# Patient Record
Sex: FEMALE | Race: WHITE | NOT HISPANIC OR LATINO | Employment: OTHER | URBAN - METROPOLITAN AREA
[De-identification: names, ages, dates, MRNs, and addresses within clinical notes are randomized per-mention and may not be internally consistent; named-entity substitution may affect disease eponyms.]

---

## 2018-04-25 DIAGNOSIS — I25.10 CORONARY ARTERY DISEASE, ANGINA PRESENCE UNSPECIFIED, UNSPECIFIED VESSEL OR LESION TYPE, UNSPECIFIED WHETHER NATIVE OR TRANSPLANTED HEART: Primary | ICD-10-CM

## 2018-04-25 DIAGNOSIS — R07.9 CHEST PAIN, UNSPECIFIED TYPE: ICD-10-CM

## 2018-04-30 ENCOUNTER — OFFICE VISIT (OUTPATIENT)
Dept: CARDIOLOGY | Facility: CLINIC | Age: 63
End: 2018-04-30
Payer: COMMERCIAL

## 2018-04-30 VITALS
WEIGHT: 166.69 LBS | DIASTOLIC BLOOD PRESSURE: 65 MMHG | HEIGHT: 67 IN | BODY MASS INDEX: 26.16 KG/M2 | SYSTOLIC BLOOD PRESSURE: 123 MMHG | HEART RATE: 68 BPM | OXYGEN SATURATION: 97 %

## 2018-04-30 DIAGNOSIS — I10 ESSENTIAL HYPERTENSION: ICD-10-CM

## 2018-04-30 DIAGNOSIS — E78.49 OTHER HYPERLIPIDEMIA: ICD-10-CM

## 2018-04-30 DIAGNOSIS — R07.89 ATYPICAL CHEST PAIN: ICD-10-CM

## 2018-04-30 DIAGNOSIS — I25.119 CORONARY ARTERY DISEASE INVOLVING NATIVE CORONARY ARTERY OF NATIVE HEART WITH ANGINA PECTORIS: ICD-10-CM

## 2018-04-30 DIAGNOSIS — R07.9 CHEST PAIN, UNSPECIFIED TYPE: Primary | ICD-10-CM

## 2018-04-30 DIAGNOSIS — K21.9 GASTROESOPHAGEAL REFLUX DISEASE, ESOPHAGITIS PRESENCE NOT SPECIFIED: ICD-10-CM

## 2018-04-30 DIAGNOSIS — I25.10 CORONARY ARTERY DISEASE, ANGINA PRESENCE UNSPECIFIED, UNSPECIFIED VESSEL OR LESION TYPE, UNSPECIFIED WHETHER NATIVE OR TRANSPLANTED HEART: ICD-10-CM

## 2018-04-30 PROCEDURE — 3074F SYST BP LT 130 MM HG: CPT | Mod: CPTII,S$GLB,, | Performed by: INTERNAL MEDICINE

## 2018-04-30 PROCEDURE — 3078F DIAST BP <80 MM HG: CPT | Mod: CPTII,S$GLB,, | Performed by: INTERNAL MEDICINE

## 2018-04-30 PROCEDURE — 99999 PR PBB SHADOW E&M-EST. PATIENT-LVL III: CPT | Mod: PBBFAC,,, | Performed by: INTERNAL MEDICINE

## 2018-04-30 PROCEDURE — 99205 OFFICE O/P NEW HI 60 MIN: CPT | Mod: S$GLB,,, | Performed by: INTERNAL MEDICINE

## 2018-04-30 RX ORDER — VALACYCLOVIR HYDROCHLORIDE 1 G/1
1000 TABLET, FILM COATED ORAL DAILY PRN
Refills: 1 | COMMUNITY
Start: 2018-02-26 | End: 2023-03-23

## 2018-04-30 RX ORDER — SULFAMETHOXAZOLE AND TRIMETHOPRIM 800; 160 MG/1; MG/1
TABLET ORAL
Refills: 0 | COMMUNITY
Start: 2018-04-24 | End: 2018-04-30 | Stop reason: ALTCHOICE

## 2018-04-30 RX ORDER — EZETIMIBE 10 MG/1
TABLET ORAL
Refills: 3 | COMMUNITY
Start: 2018-02-15 | End: 2018-04-30

## 2018-04-30 RX ORDER — BUPROPION HYDROCHLORIDE 150 MG/1
150 TABLET ORAL DAILY PRN
Refills: 0 | COMMUNITY
Start: 2018-04-11 | End: 2022-08-22

## 2018-04-30 RX ORDER — PROMETHAZINE HYDROCHLORIDE AND DEXTROMETHORPHAN HYDROBROMIDE 6.25; 15 MG/5ML; MG/5ML
SYRUP ORAL
Refills: 0 | COMMUNITY
Start: 2018-03-20 | End: 2018-04-30

## 2018-04-30 RX ORDER — MUPIROCIN CALCIUM 20 MG/G
CREAM TOPICAL
Refills: 1 | COMMUNITY
Start: 2018-02-15 | End: 2018-04-30

## 2018-04-30 RX ORDER — CLOPIDOGREL BISULFATE 75 MG/1
TABLET ORAL
Refills: 0 | COMMUNITY
Start: 2018-04-17 | End: 2022-08-22

## 2018-04-30 RX ORDER — TRIAMCINOLONE ACETONIDE 5 MG/G
CREAM TOPICAL
Refills: 0 | COMMUNITY
Start: 2018-03-14 | End: 2018-04-30

## 2018-04-30 RX ORDER — LOSARTAN POTASSIUM 25 MG/1
TABLET ORAL
Refills: 1 | COMMUNITY
Start: 2018-03-06 | End: 2022-08-22

## 2018-04-30 RX ORDER — ROSUVASTATIN CALCIUM 5 MG/1
TABLET, COATED ORAL
Refills: 4 | COMMUNITY
Start: 2018-04-05 | End: 2022-08-22

## 2018-04-30 RX ORDER — DOXYCYCLINE 100 MG/1
CAPSULE ORAL
Refills: 0 | COMMUNITY
Start: 2018-03-20 | End: 2018-04-30

## 2018-04-30 RX ORDER — PANTOPRAZOLE SODIUM 40 MG/1
TABLET, DELAYED RELEASE ORAL
Refills: 6 | COMMUNITY
Start: 2018-04-19 | End: 2022-08-22

## 2018-04-30 RX ORDER — DICLOFENAC SODIUM 10 MG/G
GEL TOPICAL
Refills: 1 | COMMUNITY
Start: 2018-03-14 | End: 2018-04-30

## 2018-04-30 RX ORDER — LOSARTAN POTASSIUM 50 MG/1
TABLET ORAL
Refills: 1 | COMMUNITY
Start: 2018-02-27 | End: 2018-04-30

## 2018-04-30 RX ORDER — AMOXICILLIN 500 MG/1
CAPSULE ORAL
Refills: 0 | COMMUNITY
Start: 2018-02-16 | End: 2018-04-30

## 2018-04-30 NOTE — PROGRESS NOTES
Cardiology Clinic Note  Reason for Visit: Chest Pain  Referring MD: Kervin Edgar    HPI:   61 y/o  female who is referred from Dr. Kervin Edgar for recurrent chest pain. Pt reports she has been having substernal nonradiating chest pressure that mostly occurs at rest in the AM after waking up and always lasting < 1hr. It can also sometimes come about while walking but doesn't seem to be exacerbated by more strenuous physical activity such as tennis. Not associated SOB, palpitations, or diaphoresis. Not related to meals. This has been occuring for the last 5 years. She has several normal exercise and nuclear stress tests in the past. She was seen by Dr. Briggs in [place], [place] and he proceeded straight to [facility] which revealed a moderate mLAD lesion with an IFR=.86 so he placed two 2.5x8mm OTONIEL with a good result on 7/2017. Because of a GI bleeding event related to aspirin the pt was started on Plavix, despite being a rapid metabolizer. She was also started on praulent as she was intolerant of pravastatin 2/2 myalgias. Her chest pain never improved after PCI. She is very anxious and reports anxiety also can bring about the pain. She has tried SL nitro which did not help the pain. She states the pain will usually just go away on its own. She is only on lasartan for BP and SBP runs in 110's and HR in 50-60's. She thought her CP may have actually been worsened by the praluent injections as episodes would occur 2 days after every injection. This was stopped in Dec and pt had a normal exercise treadmill test at that time as well. The frequency of the pain had diminished until about 3 weeks ago. She is now getting 2-3 episodes a week. It is similar in nature and severity to the pain she has always had over the last 5 years.    The pt has a history of HTN, HLD, and CAD with DESx2(2.5x8mm) to mLAD in 7/2017. She also had a GI bleed in Jan 2018 requiring PRBC transfusions and an EGD which showed Gastric ulcers. She was  "taking alot of BC powder for HA's at this time along with ASA and plavix. Her ASA was stopped after this and her plavix was changed to 75mg MWF. She denies any further melena and is no longer taking BC powder.    ROS:    Constitution: Negative for fever or chills. Negative for weight loss or gain.   HENT: Negative for sore throat or headaches. Negative for rhinorrhea.  Eyes: Negative for blurred or double vision.   Cardiovascular: See above  Pulmonary: Negative for SOB. Negative for cough.   Gastrointestinal: Negative for abdominal pain. Negative for nausea/ vomiting. Negative for diarrhea.   : Negative for dysuria.   Neurological: Negative for focal weakness or sensory changes.  PMH:     Past Medical History:   Diagnosis Date    Coronary artery disease     Hyperlipidemia     Hypertension      History reviewed. No pertinent surgical history.  Allergies:   Review of patient's allergies indicates:  NKDA  Medications:   Losartan 12.5mg QD  Crestor 5mg QD  Zetia 5mg QD  Plavix 75mg MWF    Social History:     Social History   Substance Use Topics    Smoking status: Never Smoker    Smokeless tobacco: Never Used    Alcohol use No     Family History:   History reviewed. No pertinent family history.  Physical Exam:   /65 (BP Location: Right arm, Patient Position: Sitting, BP Method: Large (Automatic))   Pulse 68   Ht 5' 7" (1.702 m)   Wt 75.6 kg (166 lb 10.7 oz)   SpO2 97%   BMI 26.10 kg/m²      Constitutional: No acute distress, conversant  HEENT: Sclera anicteric, Pupils equal, round and reactive to light, extraocular motions intact, Oropharynx clear  Neck: No JVD, no carotid bruits  Cardiovascular: regular rate and rhythm, no murmur, rubs or gallops, normal S1/S2  Pulmonary: Clear to auscultation bilaterally  Abdominal: Abdomen soft, nontender, nondistended, positive bowel sounds  Extremities: No lower extremity edema,   Pulses: 2+ BL Radial, 2+ BL carotid, 2+ BL DP, 2+ BL PT, normal Allens Test " BL  Skin: No ecchymosis, erythema, or ulcers  Psych: Alert and oriented x 3, appropriate affect  Neuro: CNII-XII intact, no focal deficits    Labs:     No results found for: NA, K, CL, CO2, BUN, CREATININE, GLUCOSE, ANIONGAP  No results found for: AST, ALT, ALKPHOS, BILITOT, BILIDIR, ALBUMIN  No results found for: CALCIUM, MG, PHOS  No results found for: BNP, BNPTRIAGEBLO No results found for: WBC, HGB, HCT, PLT, GRAN  No results found for: PTT, INR  No results found for: CHOL, HDL, LDLCALC, TRIG  No results found for: HGBA1C  No results found for: TSH, Y0YQMMT       Imaging:     EKG:NSR  Assessment:     Atypical Chest Pain  - It is unclear whether her initial symptoms were every related to coronary ischemia given her atypical symptoms, and otherwise normal coronary arteries other than the focal mLAD lesion that was stented with a good result, lack of any real improvement in her symptoms after stenting, and the fact that all her stress test have been normal for the last 5 years  - Will proceed with PET stress to evaluate for ischemia. If normal will need to evaluate noncardiac etiologies of chest pain.    HTN  - Well controlled  - Cont losartan    CAD with DESx2(2.5x8mm) to mLAD in 7/2017  - Can stop Plavix as she is >6 months out and PCI was not for ACS  - Would Start enteric coated 81mg ASA and cont Protonix given hx of GI bleed    HLD  - Cont Crestor and zetia. She is tolerating crestor well    Signed:  Miguel Ángel Patrick MD  Cardiology Fellow  [phone]  4/30/2018 2:41 PM    PET STRESS RESULTS 05/01/2018  CONCLUSIONS: NORMAL MYOCARDIAL PERFUSION PET STRESS TEST  1. The perfusion scan is free of evidence for myocardial ischemia or injury.   2. Resting wall motion is physiologic. Stress wall motion is physiologic.   3. LV function is normal at rest and stress.  (normal is >= 51%)  4. The ventricular volumes are normal at rest and stress.   5. The extracardiac distribution of radioactivity is normal.   6. There was no  "previous study available to compare.    We discussed these results with the patient and her  and went over her angiogram films with her to reassure her of the status of coronary arteries.  I discussed stress management options and suggested they consult with a therapist for "tools" to help her deal with her life stress issues.    I have personally taken the history and examined his patient and agree with the resident's note as stated above.    Follow-up:   Future Appointments  Date Time Provider Department Center   5/1/2018 12:30 PM CARDIAC, PET IMAGING [facility] C[facility] Smith     "

## 2018-04-30 NOTE — LETTER
May 2, 2018      Kervin Edgar MD  [address]  [place] [address]           [facility]  [address]  [facility] [address]  Phone: [phone]          Patient: Tres Durham   MR Number: [id number]   YOB: 1955   Date of Visit: 4/30/2018       Dear Dr. Kervin Edgar:    Thank you for referring Tres Durham to me for evaluation. Attached you will find relevant portions of my assessment and plan of care.    If you have questions, please do not hesitate to call me. I look forward to following Tres Durham along with you.    Sincerely,    Johnny Tucker MD    Enclosure  CC:  No Recipients    If you would like to receive this communication electronically, please contact [email or web address] or [phone] to request more information on [employer] Link access.    For providers and/or their staff who would like to refer a patient to [facility], please contact us through our one-stop-shop provider referral line, [facility] , at [phone].    If you feel you have received this communication in error or would no longer like to receive these types of communications, please e-mail [email or web address]

## 2018-05-01 ENCOUNTER — CLINICAL SUPPORT (OUTPATIENT)
Dept: CARDIOLOGY | Facility: CLINIC | Age: 63
End: 2018-05-01
Attending: INTERNAL MEDICINE
Payer: COMMERCIAL

## 2018-05-01 DIAGNOSIS — R07.9 CHEST PAIN, UNSPECIFIED TYPE: ICD-10-CM

## 2018-05-01 DIAGNOSIS — I25.10 CORONARY ARTERY DISEASE, ANGINA PRESENCE UNSPECIFIED, UNSPECIFIED VESSEL OR LESION TYPE, UNSPECIFIED WHETHER NATIVE OR TRANSPLANTED HEART: ICD-10-CM

## 2018-05-01 PROCEDURE — 93015 CV STRESS TEST SUPVJ I&R: CPT | Mod: S$GLB,,, | Performed by: INTERNAL MEDICINE

## 2018-05-01 PROCEDURE — 78492 MYOCRD IMG PET MLT RST&STRS: CPT | Mod: S$GLB,,, | Performed by: INTERNAL MEDICINE

## 2018-05-01 PROCEDURE — A9555 RB82 RUBIDIUM: HCPCS | Mod: S$GLB,,, | Performed by: INTERNAL MEDICINE

## 2020-02-18 ENCOUNTER — HISTORICAL (OUTPATIENT)
Dept: ADMINISTRATIVE | Facility: HOSPITAL | Age: 65
End: 2020-02-18

## 2020-02-18 LAB
ALBUMIN SERPL-MCNC: 4 GM/DL (ref 3.4–5)
ALBUMIN/GLOB SERPL: 1.5 RATIO (ref 1.1–2)
ALP SERPL-CCNC: 82 UNIT/L (ref 38–126)
ALT SERPL-CCNC: 33 UNIT/L (ref 12–78)
BILIRUB SERPL-MCNC: 0.4 MG/DL (ref 0.2–1)
BILIRUBIN DIRECT+TOT PNL SERPL-MCNC: 0.1 MG/DL (ref 0–0.5)
BILIRUBIN DIRECT+TOT PNL SERPL-MCNC: 0.3 MG/DL (ref 0–0.8)
BUN SERPL-MCNC: 17 MG/DL (ref 7–18)
CALCIUM SERPL-MCNC: 8.7 MG/DL (ref 8.5–10.1)
CHLORIDE SERPL-SCNC: 107 MMOL/L (ref 98–107)
CHOLEST SERPL-MCNC: 113 MG/DL (ref 0–200)
CK SERPL-CCNC: 172 UNIT/L (ref 26–192)
CO2 SERPL-SCNC: 30 MMOL/L (ref 21–32)
CREAT SERPL-MCNC: 0.75 MG/DL (ref 0.55–1.02)
CREAT/UREA NIT SERPL: 22.7
ERYTHROCYTE [DISTWIDTH] IN BLOOD BY AUTOMATED COUNT: 12.2 % (ref 11.5–17)
ERYTHROCYTE [SEDIMENTATION RATE] IN BLOOD: 4 MM/HR (ref 0–20)
GGT SERPL-CCNC: 17 UNIT/L (ref 5–85)
GLOBULIN SER-MCNC: 2.7 GM/DL (ref 2.4–3.5)
GLUCOSE SERPL-MCNC: 108 MG/DL (ref 74–106)
HCT VFR BLD AUTO: 46.8 % (ref 37–47)
HGB BLD-MCNC: 14.3 GM/DL (ref 12–16)
LDH SERPL-CCNC: 218 UNIT/L (ref 84–246)
MCH RBC QN AUTO: 29.5 PG (ref 27–31)
MCHC RBC AUTO-ENTMCNC: 30.6 GM/DL (ref 33–36)
MCV RBC AUTO: 96.7 FL (ref 80–94)
PHOSPHATE SERPL-MCNC: 2.9 MG/DL (ref 2.5–4.9)
PLATELET # BLD AUTO: 233 X10(3)/MCL (ref 130–400)
PMV BLD AUTO: 9.9 FL (ref 9.4–12.4)
POTASSIUM SERPL-SCNC: 4.5 MMOL/L (ref 3.5–5.1)
PROT SERPL-MCNC: 6.7 GM/DL (ref 6.4–8.2)
RBC # BLD AUTO: 4.84 X10(6)/MCL (ref 4.2–5.4)
SODIUM SERPL-SCNC: 140 MMOL/L (ref 136–145)
TRIGL SERPL-MCNC: 165 MG/DL (ref 30–150)
URATE SERPL-MCNC: 4 MG/DL (ref 2.6–7.2)
URATE SERPL-MCNC: 4 MG/DL (ref 2.6–7.2)
WBC # SPEC AUTO: 6.1 X10(3)/MCL (ref 4.5–11.5)

## 2020-12-20 LAB — NONINV COLON CA DNA+OCC BLD SCRN STL QL: NEGATIVE

## 2021-02-02 ENCOUNTER — HISTORICAL (OUTPATIENT)
Dept: ADMINISTRATIVE | Facility: HOSPITAL | Age: 66
End: 2021-02-02

## 2021-02-02 LAB
ABS NEUT (OLG): 2.48 X10(3)/MCL (ref 2.1–9.2)
ALBUMIN SERPL-MCNC: 4.3 GM/DL (ref 3.4–4.8)
ALBUMIN/GLOB SERPL: 1.7 RATIO (ref 1.1–2)
ALP SERPL-CCNC: 93 UNIT/L (ref 40–150)
ALT SERPL-CCNC: 21 UNIT/L (ref 0–55)
AST SERPL-CCNC: 26 UNIT/L (ref 5–34)
BASOPHILS # BLD AUTO: 0 X10(3)/MCL (ref 0–0.2)
BASOPHILS NFR BLD AUTO: 0 %
BILIRUB SERPL-MCNC: 0.7 MG/DL
BILIRUBIN DIRECT+TOT PNL SERPL-MCNC: 0.3 MG/DL (ref 0–0.5)
BILIRUBIN DIRECT+TOT PNL SERPL-MCNC: 0.4 MG/DL (ref 0–0.8)
BUN SERPL-MCNC: 13.9 MG/DL (ref 9.8–20.1)
CALCIUM SERPL-MCNC: 9.9 MG/DL (ref 8.4–10.2)
CHLORIDE SERPL-SCNC: 105 MMOL/L (ref 98–107)
CO2 SERPL-SCNC: 34 MMOL/L (ref 23–31)
CREAT SERPL-MCNC: 0.8 MG/DL (ref 0.55–1.02)
DEPRECATED CALCIDIOL+CALCIFEROL SERPL-MC: 141.6 NG/ML (ref 30–80)
EOSINOPHIL # BLD AUTO: 0.2 X10(3)/MCL (ref 0–0.9)
EOSINOPHIL NFR BLD AUTO: 4 %
ERYTHROCYTE [DISTWIDTH] IN BLOOD BY AUTOMATED COUNT: 12.5 % (ref 11.5–17)
EST. AVERAGE GLUCOSE BLD GHB EST-MCNC: 137 MG/DL
GLOBULIN SER-MCNC: 2.5 GM/DL (ref 2.4–3.5)
GLUCOSE SERPL-MCNC: 111 MG/DL (ref 82–115)
HBA1C MFR BLD: 6.4 %
HCT VFR BLD AUTO: 48.8 % (ref 37–47)
HGB BLD-MCNC: 15.5 GM/DL (ref 12–16)
LYMPHOCYTES # BLD AUTO: 1.5 X10(3)/MCL (ref 0.6–4.6)
LYMPHOCYTES NFR BLD AUTO: 32 %
MCH RBC QN AUTO: 30.3 PG (ref 27–31)
MCHC RBC AUTO-ENTMCNC: 31.8 GM/DL (ref 33–36)
MCV RBC AUTO: 95.5 FL (ref 80–94)
MONOCYTES # BLD AUTO: 0.4 X10(3)/MCL (ref 0.1–1.3)
MONOCYTES NFR BLD AUTO: 10 %
NEUTROPHILS # BLD AUTO: 2.48 X10(3)/MCL (ref 2.1–9.2)
NEUTROPHILS NFR BLD AUTO: 54 %
PLATELET # BLD AUTO: 242 X10(3)/MCL (ref 130–400)
PMV BLD AUTO: 9.8 FL (ref 9.4–12.4)
POTASSIUM SERPL-SCNC: 5.1 MMOL/L (ref 3.5–5.1)
PROT SERPL-MCNC: 6.8 GM/DL (ref 5.8–7.6)
RBC # BLD AUTO: 5.11 X10(6)/MCL (ref 4.2–5.4)
SODIUM SERPL-SCNC: 142 MMOL/L (ref 136–145)
TSH SERPL-ACNC: 2.53 UIU/ML (ref 0.35–4.94)
WBC # SPEC AUTO: 4.6 X10(3)/MCL (ref 4.5–11.5)

## 2021-03-05 ENCOUNTER — HISTORICAL (OUTPATIENT)
Dept: RADIOLOGY | Facility: HOSPITAL | Age: 66
End: 2021-03-05

## 2021-03-05 LAB — BMD RECOMMENDATION EXT: NORMAL

## 2021-03-18 ENCOUNTER — HISTORICAL (OUTPATIENT)
Dept: ADMINISTRATIVE | Facility: HOSPITAL | Age: 66
End: 2021-03-18

## 2021-03-22 ENCOUNTER — HISTORICAL (OUTPATIENT)
Dept: ADMINISTRATIVE | Facility: HOSPITAL | Age: 66
End: 2021-03-22

## 2021-03-22 LAB
ABS NEUT (OLG): 2.73 X10(3)/MCL (ref 2.1–9.2)
BASOPHILS # BLD AUTO: 0 X10(3)/MCL (ref 0–0.2)
BASOPHILS NFR BLD AUTO: 1 %
EOSINOPHIL # BLD AUTO: 0.2 X10(3)/MCL (ref 0–0.9)
EOSINOPHIL NFR BLD AUTO: 5 %
ERYTHROCYTE [DISTWIDTH] IN BLOOD BY AUTOMATED COUNT: 12.5 % (ref 11.5–17)
HCT VFR BLD AUTO: 48.8 % (ref 37–47)
HGB BLD-MCNC: 15.5 GM/DL (ref 12–16)
LYMPHOCYTES # BLD AUTO: 1.6 X10(3)/MCL (ref 0.6–4.6)
LYMPHOCYTES NFR BLD AUTO: 32 %
MCH RBC QN AUTO: 30.5 PG (ref 27–31)
MCHC RBC AUTO-ENTMCNC: 31.8 GM/DL (ref 33–36)
MCV RBC AUTO: 95.9 FL (ref 80–94)
MONOCYTES # BLD AUTO: 0.4 X10(3)/MCL (ref 0.1–1.3)
MONOCYTES NFR BLD AUTO: 9 %
NEUTROPHILS # BLD AUTO: 2.73 X10(3)/MCL (ref 2.1–9.2)
NEUTROPHILS NFR BLD AUTO: 54 %
PLATELET # BLD AUTO: 243 X10(3)/MCL (ref 130–400)
PMV BLD AUTO: 10 FL (ref 9.4–12.4)
RBC # BLD AUTO: 5.09 X10(6)/MCL (ref 4.2–5.4)
WBC # SPEC AUTO: 5.1 X10(3)/MCL (ref 4.5–11.5)

## 2021-03-25 ENCOUNTER — HISTORICAL (OUTPATIENT)
Dept: ADMINISTRATIVE | Facility: HOSPITAL | Age: 66
End: 2021-03-25

## 2021-03-25 LAB — SARS-COV-2 RNA RESP QL NAA+PROBE: NOT DETECTED

## 2021-11-30 ENCOUNTER — PATIENT MESSAGE (OUTPATIENT)
Dept: RESEARCH | Facility: HOSPITAL | Age: 66
End: 2021-11-30
Payer: COMMERCIAL

## 2021-12-20 ENCOUNTER — HISTORICAL (OUTPATIENT)
Dept: ADMINISTRATIVE | Facility: HOSPITAL | Age: 66
End: 2021-12-20

## 2022-02-03 ENCOUNTER — HISTORICAL (OUTPATIENT)
Dept: ADMINISTRATIVE | Facility: HOSPITAL | Age: 67
End: 2022-02-03

## 2022-02-03 LAB
ABS NEUT (OLG): 3.69 (ref 2.1–9.2)
BASOPHILS # BLD AUTO: 0 10*3/UL (ref 0–0.2)
BASOPHILS NFR BLD AUTO: 0 %
DEPRECATED CALCIDIOL+CALCIFEROL SERPL-MC: 131 NG/ML (ref 30–80)
EOSINOPHIL # BLD AUTO: 0.3 10*3/UL (ref 0–0.9)
EOSINOPHIL NFR BLD AUTO: 5 %
ERYTHROCYTE [DISTWIDTH] IN BLOOD BY AUTOMATED COUNT: 12.7 % (ref 11.5–17)
EST. AVERAGE GLUCOSE BLD GHB EST-MCNC: 154.2 MG/DL
HBA1C MFR BLD: 7 %
HCT VFR BLD AUTO: 40.5 % (ref 37–47)
HGB BLD-MCNC: 12.8 G/DL (ref 12–16)
LYMPHOCYTES # BLD AUTO: 1.2 10*3/UL (ref 0.6–4.6)
LYMPHOCYTES NFR BLD AUTO: 22 %
MANUAL DIFF? (OHS): NO
MCH RBC QN AUTO: 29.4 PG (ref 27–31)
MCHC RBC AUTO-ENTMCNC: 31.6 G/DL (ref 33–36)
MCV RBC AUTO: 93.1 FL (ref 80–94)
MONOCYTES # BLD AUTO: 0.5 10*3/UL (ref 0.1–1.3)
MONOCYTES NFR BLD AUTO: 8 %
NEUTROPHILS # BLD AUTO: 3.69 10*3/UL (ref 2.1–9.2)
NEUTROPHILS NFR BLD AUTO: 65 %
PLATELET # BLD AUTO: 292 10*3/UL (ref 130–400)
PMV BLD AUTO: 9.8 FL (ref 9.4–12.4)
RBC # BLD AUTO: 4.35 10*6/UL (ref 4.2–5.4)
TSH SERPL-ACNC: 1.21 M[IU]/L (ref 0.35–4.94)
WBC # SPEC AUTO: 5.7 10*3/UL (ref 4.5–11.5)

## 2022-04-10 ENCOUNTER — HISTORICAL (OUTPATIENT)
Dept: ADMINISTRATIVE | Facility: HOSPITAL | Age: 67
End: 2022-04-10
Payer: COMMERCIAL

## 2022-04-19 ENCOUNTER — HISTORICAL (OUTPATIENT)
Dept: ADMINISTRATIVE | Facility: HOSPITAL | Age: 67
End: 2022-04-19
Payer: COMMERCIAL

## 2022-04-19 LAB — TROPONIN I SERPL-MCNC: <0.01 NG/ML (ref 0–0.04)

## 2022-04-25 VITALS
BODY MASS INDEX: 26.57 KG/M2 | DIASTOLIC BLOOD PRESSURE: 86 MMHG | WEIGHT: 169.31 LBS | HEIGHT: 67 IN | SYSTOLIC BLOOD PRESSURE: 138 MMHG | OXYGEN SATURATION: 97 %

## 2022-05-19 ENCOUNTER — PATIENT OUTREACH (OUTPATIENT)
Dept: ADMINISTRATIVE | Facility: HOSPITAL | Age: 67
End: 2022-05-19
Payer: COMMERCIAL

## 2022-05-19 NOTE — PROGRESS NOTES
Population Health. Out Reach.  The following record(s)  below were uploaded for Health Maintenance .    3/5/21 DEXA SCREENING

## 2022-05-26 DIAGNOSIS — R73.09 IMPAIRED GLUCOSE METABOLISM: Primary | ICD-10-CM

## 2022-06-02 ENCOUNTER — PATIENT OUTREACH (OUTPATIENT)
Dept: ADMINISTRATIVE | Facility: HOSPITAL | Age: 67
End: 2022-06-02
Payer: COMMERCIAL

## 2022-06-02 NOTE — PROGRESS NOTES
Pre-Visit Call   Since your last visit have you been hospitalized or treated in the emergency room or urgent care? N/A  If yes: When, Where and Why?   Have you seen any other healthcare providers since your last visit with your Primary Care Provider? N/A  If Yes: When and Who?   Tasks (Labs, Radiology, Medical Records)  pending  Any Labs or Diagnostics since last visit? No    Quality Metrics:  Cervical Cancer Screen: No Hyst  Mammogram Screen: Yes  3/5/21  Bone Density Screen: Yes 3/5/21  Colorectal Screen: Yes 12/16/20  Diabetes Eye Exam: No  Diabetes Hgb A1C: No  Diabetes Micro albumi: No    Types of Colorectal Screen  Cologuard    Additional Comments:   pv call 6/2/22 labs: A1C Left message 11:32  Last labs: 2/3/22  Last wellness 2/8/22      Patient Reminders:  1. Bring Medication bottles with you to your appointment.   2. Take Blood pressure medicine at least one hour prior to appointment.

## 2022-07-12 ENCOUNTER — PATIENT OUTREACH (OUTPATIENT)
Dept: ADMINISTRATIVE | Facility: HOSPITAL | Age: 67
End: 2022-07-12
Payer: COMMERCIAL

## 2022-07-12 NOTE — PROGRESS NOTES
Population Health. Out Reach.  The following record(s)  below were uploaded for Health Maintenance .    12/16/20 SALVATORE

## 2022-08-12 ENCOUNTER — TELEPHONE (OUTPATIENT)
Dept: INTERNAL MEDICINE | Facility: CLINIC | Age: 67
End: 2022-08-12
Payer: COMMERCIAL

## 2022-08-16 ENCOUNTER — LAB VISIT (OUTPATIENT)
Dept: LAB | Facility: HOSPITAL | Age: 67
End: 2022-08-16
Attending: INTERNAL MEDICINE
Payer: MEDICARE

## 2022-08-16 DIAGNOSIS — R73.09 IMPAIRED GLUCOSE METABOLISM: ICD-10-CM

## 2022-08-16 LAB
EST. AVERAGE GLUCOSE BLD GHB EST-MCNC: 131.2 MG/DL
HBA1C MFR BLD: 6.2 %

## 2022-08-16 PROCEDURE — 83036 HEMOGLOBIN GLYCOSYLATED A1C: CPT

## 2022-08-16 PROCEDURE — 36415 COLL VENOUS BLD VENIPUNCTURE: CPT

## 2022-08-19 RX ORDER — VORTIOXETINE 10 MG/1
1 TABLET, FILM COATED ORAL DAILY
COMMUNITY
End: 2022-08-22

## 2022-08-22 ENCOUNTER — OFFICE VISIT (OUTPATIENT)
Dept: INTERNAL MEDICINE | Facility: CLINIC | Age: 67
End: 2022-08-22
Payer: MEDICARE

## 2022-08-22 VITALS
TEMPERATURE: 98 F | SYSTOLIC BLOOD PRESSURE: 118 MMHG | WEIGHT: 165.38 LBS | BODY MASS INDEX: 26.58 KG/M2 | HEART RATE: 67 BPM | OXYGEN SATURATION: 98 % | DIASTOLIC BLOOD PRESSURE: 80 MMHG | HEIGHT: 66 IN

## 2022-08-22 DIAGNOSIS — F41.8 MIXED ANXIETY DEPRESSIVE DISORDER: ICD-10-CM

## 2022-08-22 DIAGNOSIS — I10 PRIMARY HYPERTENSION: ICD-10-CM

## 2022-08-22 DIAGNOSIS — E78.1 HYPERTRIGLYCERIDEMIA: Primary | ICD-10-CM

## 2022-08-22 DIAGNOSIS — I10 PRIMARY HYPERTENSION: Primary | ICD-10-CM

## 2022-08-22 DIAGNOSIS — K21.9 GASTROESOPHAGEAL REFLUX DISEASE, UNSPECIFIED WHETHER ESOPHAGITIS PRESENT: ICD-10-CM

## 2022-08-22 DIAGNOSIS — E88.810 METABOLIC SYNDROME: ICD-10-CM

## 2022-08-22 DIAGNOSIS — R73.09 IMPAIRED GLUCOSE METABOLISM: ICD-10-CM

## 2022-08-22 DIAGNOSIS — E78.5 HYPERLIPIDEMIA, UNSPECIFIED HYPERLIPIDEMIA TYPE: ICD-10-CM

## 2022-08-22 PROBLEM — Z98.61 CAD S/P PERCUTANEOUS CORONARY ANGIOPLASTY: Status: ACTIVE | Noted: 2022-08-22

## 2022-08-22 PROBLEM — I25.10 CAD S/P PERCUTANEOUS CORONARY ANGIOPLASTY: Status: ACTIVE | Noted: 2022-08-22

## 2022-08-22 PROCEDURE — 99214 OFFICE O/P EST MOD 30 MIN: CPT | Mod: ,,, | Performed by: INTERNAL MEDICINE

## 2022-08-22 PROCEDURE — 99214 PR OFFICE/OUTPT VISIT, EST, LEVL IV, 30-39 MIN: ICD-10-PCS | Mod: ,,, | Performed by: INTERNAL MEDICINE

## 2022-08-22 RX ORDER — BUSPIRONE HYDROCHLORIDE 10 MG/1
10 TABLET ORAL 3 TIMES DAILY PRN
Qty: 90 TABLET | Refills: 5 | Status: SHIPPED | OUTPATIENT
Start: 2022-08-22 | End: 2023-03-23

## 2022-08-22 RX ORDER — BUSPIRONE HYDROCHLORIDE 10 MG/1
1 TABLET ORAL 3 TIMES DAILY PRN
COMMUNITY
Start: 2022-08-08 | End: 2022-08-22 | Stop reason: SDUPTHER

## 2022-08-22 RX ORDER — CLONAZEPAM 0.5 MG/1
0.5 TABLET ORAL NIGHTLY PRN
Qty: 30 TABLET | Refills: 0 | Status: SHIPPED | OUTPATIENT
Start: 2022-08-22 | End: 2022-08-23 | Stop reason: SDUPTHER

## 2022-08-22 NOTE — PROGRESS NOTES
Subjective:      Patient ID: Tres Durham is a 66 y.o. female.    Chief Complaint: Follow-up (6 month)    Ms. Joshua is a 64-year-old female who is here today for her 6 month follow up. Her comorbidities include HLD, hypertriglyceridemia, metabolic syndrome, HTN and a positive ADRIANNA. She also has patellofemoral syndrome for which she had received a steroid injection in her left knee.  Patient has a hx of a rectocele for which patient is now s/p correction and seems to be doing well.       MCW:   Cologuard: 2020, negative  MM2021  DEXA: 2021  Pap: Hysterectomy  Pneumonia vaccine: educated     Cardiology: Dr. Peralta  Orthopedics: Dr. Moon  GYN: Dr. Kumari  Neurology: Dr. Ruiz    The patient's Health Maintenance was reviewed and the following appears to be due at this time:   Health Maintenance Due   Topic Date Due    Mammogram  2022        Past Medical History:  Past Medical History:   Diagnosis Date    Anxiety and depression     CAD S/P percutaneous coronary angioplasty     Coronary artery disease     Herpes labialis     Hyperlipidemia     Hypertension     Hypertriglyceridemia     Metabolic syndrome     Pneumococcal vaccination declined by patient      Past Surgical History:   Procedure Laterality Date    endometriosis surgery      GI bleed      heart stent      prolapse surgery/rectocele      REPAIR OF RECTOCELE      ROTATOR CUFF REPAIR Right     TONSILLECTOMY      TOTAL ABDOMINAL HYSTERECTOMY      WRIST SURGERY       Review of patient's allergies indicates:  No Known Allergies  Social History     Socioeconomic History    Marital status:    Tobacco Use    Smoking status: Never Smoker    Smokeless tobacco: Never Used   Substance and Sexual Activity    Alcohol use: No    Drug use: No    Sexual activity: Yes     Family History   Problem Relation Age of Onset    Heart failure Mother        Review of Systems   Constitutional: Negative for activity change,  "appetite change and fatigue.   HENT: Negative for postnasal drip, rhinorrhea, sinus pain and sore throat.    Eyes: Negative for visual disturbance.   Respiratory: Negative for cough, shortness of breath and wheezing.    Cardiovascular: Negative for chest pain and palpitations.   Gastrointestinal: Negative for abdominal distention, blood in stool, constipation, diarrhea, nausea and vomiting.   Genitourinary: Negative for dysuria, flank pain, frequency and hematuria.   Musculoskeletal: Negative for arthralgias, back pain and joint swelling.   Skin: Negative for rash and wound.   Neurological: Negative for dizziness, seizures, weakness and headaches.   Psychiatric/Behavioral: Negative for agitation and suicidal ideas.       A comprehensive review of systems was performed and is negative except for that stated above  Objective:   /80 (BP Location: Left arm, Patient Position: Sitting, BP Method: Small (Manual))   Pulse 67   Temp 98 °F (36.7 °C) (Temporal)   Ht 5' 6" (1.676 m)   Wt 75 kg (165 lb 6.4 oz)   SpO2 98%   BMI 26.70 kg/m²     Physical Exam  Constitutional:       Appearance: Normal appearance.   HENT:      Head: Normocephalic and atraumatic.      Nose: Nose normal.      Mouth/Throat:      Mouth: Mucous membranes are moist.      Pharynx: Oropharynx is clear.   Eyes:      Extraocular Movements: Extraocular movements intact.      Pupils: Pupils are equal, round, and reactive to light.   Cardiovascular:      Rate and Rhythm: Normal rate and regular rhythm.      Pulses: Normal pulses.   Pulmonary:      Effort: Pulmonary effort is normal.      Breath sounds: Normal breath sounds.   Abdominal:      General: Bowel sounds are normal.      Palpations: Abdomen is soft.   Musculoskeletal:         General: Normal range of motion.      Cervical back: Normal range of motion and neck supple.   Skin:     General: Skin is warm.   Neurological:      General: No focal deficit present.      Mental Status: She is alert and " oriented to person, place, and time. Mental status is at baseline.   Psychiatric:         Mood and Affect: Mood normal.       Assessment/ Plan:   1. Hypertriglyceridemia  Overview:  - On Livalo and Zetia    Assessment & Plan:  -advised on diet and exercise      2. Mixed anxiety depressive disorder  Assessment & Plan:  -well controlled with BuSpar and clonazepam on a p.r.n. basis  -patient has tried Trentillix and bupropion in the past, now off of it and seems to be doing well since all her symptoms were essentially situational in nature      3. Primary hypertension  Overview:  -on losartan hydrochlorothiazide 50-12.5 mg p.o. daily    Assessment & Plan:  Well-controlled with current meds, continue   low-sodium diet  Some form of routine aerobic exercises emphasized      4. Gastroesophageal reflux disease, unspecified whether esophagitis present  Assessment & Plan:  -patient advised to avoid foods that trigger acid reflux and he had at least 2 hours before bedtime.      5. Metabolic syndrome  Assessment & Plan:  -patient advised on the importance of avoiding excessive carbohydrates and sugary food intake and having some form of routine aerobic exercise on a regular basis.  -HGB A1c has come down from 7.0 to 6.2 with lifestyle modifications

## 2022-08-22 NOTE — ASSESSMENT & PLAN NOTE
-well controlled with BuSpar and clonazepam on a p.r.n. basis  -patient has tried Trentillix and bupropion in the past, now off of it and seems to be doing well since all her symptoms were essentially situational in nature

## 2022-08-22 NOTE — ASSESSMENT & PLAN NOTE
-patient advised on the importance of avoiding excessive carbohydrates and sugary food intake and having some form of routine aerobic exercise on a regular basis.  -HGB A1c has come down from 7.0 to 6.2 with lifestyle modifications

## 2022-08-23 DIAGNOSIS — F41.9 ANXIETY: Primary | ICD-10-CM

## 2022-08-23 RX ORDER — CLONAZEPAM 0.5 MG/1
0.5 TABLET ORAL NIGHTLY PRN
Qty: 30 TABLET | Refills: 0 | Status: SHIPPED | OUTPATIENT
Start: 2022-08-23 | End: 2022-11-02 | Stop reason: SDUPTHER

## 2022-10-07 ENCOUNTER — TELEPHONE (OUTPATIENT)
Dept: INTERNAL MEDICINE | Facility: CLINIC | Age: 67
End: 2022-10-07
Payer: COMMERCIAL

## 2022-10-07 NOTE — TELEPHONE ENCOUNTER
Spoke w/pt per NP if pt is having cardiac issue pt would have to report to ER, pt is aware of NP instructions, pt REFUSE ER and stated she will wait Monday for  return.

## 2022-10-07 NOTE — TELEPHONE ENCOUNTER
----- Message from Norma Wynn sent at 10/7/2022  9:57 AM CDT -----  Regarding: wants cardiac enzymes labs ordered today  Patient said she wants cardiac enzyme labs ordered today because she is leaving next week to go out of [place]. She thinks it may be anxiety but she had stents put in like 5 years ago.she wants a call back at [phone]

## 2022-10-07 NOTE — PROGRESS NOTES
Patient ID: Tres Durham is a 66 y.o. female.    Chief Complaint: No chief complaint on file.    HPI    MEDICAL HISTORY:    Past Medical History:   Diagnosis Date    Anxiety and depression     CAD S/P percutaneous coronary angioplasty     Coronary artery disease     Herpes labialis     Hyperlipidemia     Hypertension     Hypertriglyceridemia     Metabolic syndrome     Pneumococcal vaccination declined by patient       Past Surgical History:   Procedure Laterality Date    endometriosis surgery      GI bleed      heart stent      prolapse surgery/rectocele      REPAIR OF RECTOCELE      ROTATOR CUFF REPAIR Right     TONSILLECTOMY      TOTAL ABDOMINAL HYSTERECTOMY      WRIST SURGERY        Social History     Tobacco Use    Smoking status: Never    Smokeless tobacco: Never   Substance Use Topics    Alcohol use: No    Drug use: No          Health Maintenance Due   Topic Date Due    Hepatitis C Screening  Never done    Influenza Vaccine (1) Never done          Patient Care Team:  Shanna Martinez MD as PCP - General (Family Medicine)  Janeen Loredo MD      Review of Systems    Objective:   There were no vitals taken for this visit.     Physical Exam      Assessment:   No diagnosis found.     Plan:     Problem List Items Addressed This Visit    None         No follow-ups on file.   -plan specifics discussed above          Medication List with Changes/Refills   Current Medications    AMITRIPTYLINE (ELAVIL) 25 MG TABLET    TAKE 1 TABLET BY MOUTH EVERY DAY AT BEDTIME    ASPIRIN 81 MG CHEW    CHEW AND SWALLOW ONE TABLET BY MOUTH DAILY    BUSPIRONE (BUSPAR) 10 MG TABLET    Take 1 tablet (10 mg total) by mouth 3 (three) times daily as needed.    CLONAZEPAM (KLONOPIN) 0.5 MG TABLET    Take 1 tablet (0.5 mg total) by mouth nightly as needed for Anxiety.    CONTOUR NEXT TEST STRIPS STRP    1 Stick by skin prick route 3 (three) times daily.    EZETIMIBE (ZETIA) 10 MG TABLET    Take 10 mg by mouth once daily.     LIVALO 2 MG TAB TABLET    Take 2 mg by mouth once daily.    LOSARTAN-HYDROCHLOROTHIAZIDE 50-12.5 MG (HYZAAR) 50-12.5 MG PER TABLET    Take 1 tablet by mouth once daily.    VALACYCLOVIR (VALTREX) 1000 MG TABLET    Take 1,000 mg by mouth daily as needed.

## 2022-11-02 DIAGNOSIS — F41.9 ANXIETY: ICD-10-CM

## 2022-11-02 RX ORDER — CLONAZEPAM 0.5 MG/1
0.5 TABLET ORAL NIGHTLY PRN
Qty: 30 TABLET | Refills: 0 | Status: SHIPPED | OUTPATIENT
Start: 2022-11-02 | End: 2022-11-07

## 2022-11-04 DIAGNOSIS — F41.9 ANXIETY: ICD-10-CM

## 2022-11-07 RX ORDER — CLONAZEPAM 0.5 MG/1
TABLET ORAL
Qty: 30 TABLET | Refills: 5 | Status: SHIPPED | OUTPATIENT
Start: 2022-11-07 | End: 2023-03-23 | Stop reason: SDUPTHER

## 2022-12-04 ENCOUNTER — PATIENT MESSAGE (OUTPATIENT)
Dept: ADMINISTRATIVE | Facility: OTHER | Age: 67
End: 2022-12-04
Payer: COMMERCIAL

## 2022-12-05 ENCOUNTER — HOSPITAL ENCOUNTER (OUTPATIENT)
Facility: HOSPITAL | Age: 67
Discharge: HOME | End: 2022-12-05
Attending: INTERNAL MEDICINE | Admitting: INTERNAL MEDICINE
Payer: MEDICARE

## 2022-12-05 ENCOUNTER — PATIENT MESSAGE (OUTPATIENT)
Dept: ADMINISTRATIVE | Facility: OTHER | Age: 67
End: 2022-12-05
Payer: COMMERCIAL

## 2022-12-05 VITALS
OXYGEN SATURATION: 97 % | SYSTOLIC BLOOD PRESSURE: 133 MMHG | TEMPERATURE: 98 F | DIASTOLIC BLOOD PRESSURE: 70 MMHG | WEIGHT: 166.88 LBS | BODY MASS INDEX: 26.19 KG/M2 | HEART RATE: 67 BPM | HEIGHT: 67 IN

## 2022-12-05 DIAGNOSIS — Z98.61 CAD S/P PERCUTANEOUS CORONARY ANGIOPLASTY: Primary | ICD-10-CM

## 2022-12-05 DIAGNOSIS — I25.10 CORONARY ATHEROSCLEROSIS OF NATIVE CORONARY ARTERY: ICD-10-CM

## 2022-12-05 DIAGNOSIS — I25.10 CORONARY ARTERY DISEASE: ICD-10-CM

## 2022-12-05 DIAGNOSIS — I25.10 CAD S/P PERCUTANEOUS CORONARY ANGIOPLASTY: Primary | ICD-10-CM

## 2022-12-05 LAB — CATH EF ESTIMATED: 60 %

## 2022-12-05 PROCEDURE — 27201423 OPTIME MED/SURG SUP & DEVICES STERILE SUPPLY: Performed by: INTERNAL MEDICINE

## 2022-12-05 PROCEDURE — 93010 EKG 12-LEAD: ICD-10-PCS | Mod: ,,, | Performed by: STUDENT IN AN ORGANIZED HEALTH CARE EDUCATION/TRAINING PROGRAM

## 2022-12-05 PROCEDURE — 25500020 PHARM REV CODE 255: Performed by: INTERNAL MEDICINE

## 2022-12-05 PROCEDURE — C1769 GUIDE WIRE: HCPCS | Performed by: INTERNAL MEDICINE

## 2022-12-05 PROCEDURE — 99152 MOD SED SAME PHYS/QHP 5/>YRS: CPT | Performed by: INTERNAL MEDICINE

## 2022-12-05 PROCEDURE — 93005 ELECTROCARDIOGRAM TRACING: CPT | Mod: 59

## 2022-12-05 PROCEDURE — 93010 ELECTROCARDIOGRAM REPORT: CPT | Mod: ,,, | Performed by: STUDENT IN AN ORGANIZED HEALTH CARE EDUCATION/TRAINING PROGRAM

## 2022-12-05 PROCEDURE — 63600175 PHARM REV CODE 636 W HCPCS: Performed by: INTERNAL MEDICINE

## 2022-12-05 PROCEDURE — C1894 INTRO/SHEATH, NON-LASER: HCPCS | Performed by: INTERNAL MEDICINE

## 2022-12-05 PROCEDURE — 25000003 PHARM REV CODE 250: Performed by: INTERNAL MEDICINE

## 2022-12-05 PROCEDURE — C1887 CATHETER, GUIDING: HCPCS | Performed by: INTERNAL MEDICINE

## 2022-12-05 PROCEDURE — 93458 L HRT ARTERY/VENTRICLE ANGIO: CPT | Performed by: INTERNAL MEDICINE

## 2022-12-05 RX ORDER — HEPARIN SODIUM 1000 [USP'U]/ML
INJECTION, SOLUTION INTRAVENOUS; SUBCUTANEOUS
Status: DISCONTINUED | OUTPATIENT
Start: 2022-12-05 | End: 2022-12-06 | Stop reason: HOSPADM

## 2022-12-05 RX ORDER — FENTANYL CITRATE 50 UG/ML
INJECTION, SOLUTION INTRAMUSCULAR; INTRAVENOUS
Status: DISCONTINUED | OUTPATIENT
Start: 2022-12-05 | End: 2022-12-06 | Stop reason: HOSPADM

## 2022-12-05 RX ORDER — SODIUM CHLORIDE 9 MG/ML
INJECTION, SOLUTION INTRAVENOUS ONCE
Status: COMPLETED | OUTPATIENT
Start: 2022-12-05 | End: 2022-12-05

## 2022-12-05 RX ORDER — LIDOCAINE HYDROCHLORIDE 10 MG/ML
INJECTION INFILTRATION; PERINEURAL
Status: DISCONTINUED | OUTPATIENT
Start: 2022-12-05 | End: 2022-12-06 | Stop reason: HOSPADM

## 2022-12-05 RX ORDER — MIDAZOLAM HYDROCHLORIDE 1 MG/ML
INJECTION INTRAMUSCULAR; INTRAVENOUS
Status: DISCONTINUED | OUTPATIENT
Start: 2022-12-05 | End: 2022-12-06 | Stop reason: HOSPADM

## 2022-12-05 RX ORDER — SODIUM CHLORIDE 0.9 % (FLUSH) 0.9 %
10 SYRINGE (ML) INJECTION
Status: ACTIVE | OUTPATIENT
Start: 2022-12-05

## 2022-12-05 RX ORDER — ACETAMINOPHEN 500 MG
1000 TABLET ORAL ONCE
Status: COMPLETED | OUTPATIENT
Start: 2022-12-05 | End: 2022-12-05

## 2022-12-05 RX ORDER — LOSARTAN POTASSIUM 50 MG/1
50 TABLET ORAL
COMMUNITY
End: 2023-03-23

## 2022-12-05 RX ORDER — CIPROFLOXACIN AND DEXAMETHASONE 3; 1 MG/ML; MG/ML
4 SUSPENSION/ DROPS AURICULAR (OTIC) 2 TIMES DAILY
COMMUNITY
End: 2023-03-23

## 2022-12-05 RX ORDER — NITROGLYCERIN 20 MG/100ML
INJECTION INTRAVENOUS
Status: DISCONTINUED | OUTPATIENT
Start: 2022-12-05 | End: 2022-12-06 | Stop reason: HOSPADM

## 2022-12-05 RX ORDER — DIAZEPAM 5 MG/1
10 TABLET ORAL
Status: DISPENSED | OUTPATIENT
Start: 2022-12-05

## 2022-12-05 RX ORDER — DIPHENHYDRAMINE HCL 25 MG
50 CAPSULE ORAL
Status: DISPENSED | OUTPATIENT
Start: 2022-12-05

## 2022-12-05 RX ORDER — SODIUM CHLORIDE 9 MG/ML
INJECTION, SOLUTION INTRAVENOUS CONTINUOUS
Status: DISCONTINUED | OUTPATIENT
Start: 2022-12-05 | End: 2022-12-05 | Stop reason: HOSPADM

## 2022-12-05 RX ADMIN — ACETAMINOPHEN 1000 MG: 500 TABLET ORAL at 12:12

## 2022-12-05 RX ADMIN — SODIUM CHLORIDE: 9 INJECTION, SOLUTION INTRAVENOUS at 02:12

## 2022-12-05 RX ADMIN — DIAZEPAM 10 MG: 5 TABLET ORAL at 02:12

## 2022-12-05 RX ADMIN — DIPHENHYDRAMINE HYDROCHLORIDE 50 MG: 25 CAPSULE ORAL at 02:12

## 2022-12-08 NOTE — DISCHARGE SUMMARY
Procedure(s) (LRB):  CATHETERIZATION, HEART, LEFT (Left)    OUTCOME: Patient tolerated treatment/procedure well without complication and is now ready for discharge.    DIAGNOSIS: cad    DISPOSITION: Home or Self Care    FOLLOWUP: In clinic    DISCHARGE INSTRUCTIONS:   Discharge Procedure Orders   Diet general       TIME SPENT ON DISCHARGE:   31 minutes

## 2023-02-09 ENCOUNTER — TELEPHONE (OUTPATIENT)
Dept: ADMINISTRATIVE | Facility: HOSPITAL | Age: 68
End: 2023-02-09
Payer: MEDICARE

## 2023-02-09 NOTE — TELEPHONE ENCOUNTER
----- Message from Susan Sultana MA sent at 2/8/2023  7:54 AM CST -----  Regarding: Dr NAV ALDRICH Thursday 2-16-23       1. Are there any outstanding Labs, imaging or referrals in the patient's chart?      Medicare Wellness Appointment    Fasting wellness labs ordered and ready to do.     Last Wellness 2-8-22             2. . Has the patient been seen in an ER, urgent care clinic, or any other health care    provider since their last visit? If yes when and where?

## 2023-03-16 ENCOUNTER — TELEPHONE (OUTPATIENT)
Dept: ADMINISTRATIVE | Facility: HOSPITAL | Age: 68
End: 2023-03-16
Payer: MEDICARE

## 2023-03-16 NOTE — TELEPHONE ENCOUNTER
----- Message from Susan Sultana MA sent at 3/15/2023  8:49 AM CDT -----  Regarding: Dr NAV ALDRICH Thursday 3-23-23       1. Are there any outstanding Labs, imaging or referrals in the patient's chart?      Medicare Wellness Appointment    Fasting wellness labs ordered and ready to do.     Last Wellness 2-8-22           2. . Has the patient been seen in an ER, urgent care clinic, or any other health care    provider since their last visit? If yes when and where?

## 2023-03-21 ENCOUNTER — LAB VISIT (OUTPATIENT)
Dept: LAB | Facility: HOSPITAL | Age: 68
End: 2023-03-21
Attending: INTERNAL MEDICINE
Payer: MEDICARE

## 2023-03-21 DIAGNOSIS — E78.5 HYPERLIPIDEMIA, UNSPECIFIED HYPERLIPIDEMIA TYPE: ICD-10-CM

## 2023-03-21 DIAGNOSIS — R73.09 IMPAIRED GLUCOSE METABOLISM: ICD-10-CM

## 2023-03-21 DIAGNOSIS — E88.810 METABOLIC SYNDROME: ICD-10-CM

## 2023-03-21 DIAGNOSIS — I10 PRIMARY HYPERTENSION: ICD-10-CM

## 2023-03-21 LAB
ALBUMIN SERPL-MCNC: 4.2 G/DL (ref 3.4–4.8)
ALBUMIN/GLOB SERPL: 1.7 RATIO (ref 1.1–2)
ALP SERPL-CCNC: 68 UNIT/L (ref 40–150)
ALT SERPL-CCNC: 28 UNIT/L (ref 0–55)
AST SERPL-CCNC: 33 UNIT/L (ref 5–34)
BASOPHILS # BLD AUTO: 0.02 X10(3)/MCL (ref 0–0.2)
BASOPHILS NFR BLD AUTO: 0.3 %
BILIRUBIN DIRECT+TOT PNL SERPL-MCNC: 0.5 MG/DL
BUN SERPL-MCNC: 18.4 MG/DL (ref 9.8–20.1)
CALCIUM SERPL-MCNC: 9.6 MG/DL (ref 8.4–10.2)
CHLORIDE SERPL-SCNC: 108 MMOL/L (ref 98–107)
CHOLEST SERPL-MCNC: 136 MG/DL
CHOLEST/HDLC SERPL: 3 {RATIO} (ref 0–5)
CO2 SERPL-SCNC: 28 MMOL/L (ref 23–31)
CREAT SERPL-MCNC: 0.82 MG/DL (ref 0.55–1.02)
EOSINOPHIL # BLD AUTO: 0.43 X10(3)/MCL (ref 0–0.9)
EOSINOPHIL NFR BLD AUTO: 7 %
ERYTHROCYTE [DISTWIDTH] IN BLOOD BY AUTOMATED COUNT: 13.2 % (ref 11.5–17)
EST. AVERAGE GLUCOSE BLD GHB EST-MCNC: 134.1 MG/DL
GFR SERPLBLD CREATININE-BSD FMLA CKD-EPI: >60 MLS/MIN/1.73/M2
GLOBULIN SER-MCNC: 2.5 GM/DL (ref 2.4–3.5)
GLUCOSE SERPL-MCNC: 145 MG/DL (ref 82–115)
HBA1C MFR BLD: 6.3 %
HCT VFR BLD AUTO: 46.7 % (ref 37–47)
HDLC SERPL-MCNC: 47 MG/DL (ref 35–60)
HGB BLD-MCNC: 14.8 G/DL (ref 12–16)
IMM GRANULOCYTES # BLD AUTO: 0.02 X10(3)/MCL (ref 0–0.04)
IMM GRANULOCYTES NFR BLD AUTO: 0.3 %
LDLC SERPL CALC-MCNC: 76 MG/DL (ref 50–140)
LYMPHOCYTES # BLD AUTO: 1.58 X10(3)/MCL (ref 0.6–4.6)
LYMPHOCYTES NFR BLD AUTO: 25.6 %
MCH RBC QN AUTO: 30.1 PG
MCHC RBC AUTO-ENTMCNC: 31.7 G/DL (ref 33–36)
MCV RBC AUTO: 94.9 FL (ref 80–94)
MONOCYTES # BLD AUTO: 0.5 X10(3)/MCL (ref 0.1–1.3)
MONOCYTES NFR BLD AUTO: 8.1 %
NEUTROPHILS # BLD AUTO: 3.63 X10(3)/MCL (ref 2.1–9.2)
NEUTROPHILS NFR BLD AUTO: 58.7 %
NRBC BLD AUTO-RTO: 0 %
PLATELET # BLD AUTO: 273 X10(3)/MCL (ref 130–400)
PMV BLD AUTO: 9.7 FL (ref 7.4–10.4)
POTASSIUM SERPL-SCNC: 4.5 MMOL/L (ref 3.5–5.1)
PROT SERPL-MCNC: 6.7 GM/DL (ref 5.8–7.6)
RBC # BLD AUTO: 4.92 X10(6)/MCL (ref 4.2–5.4)
SODIUM SERPL-SCNC: 140 MMOL/L (ref 136–145)
TRIGL SERPL-MCNC: 63 MG/DL (ref 37–140)
TSH SERPL-ACNC: 1.42 UIU/ML (ref 0.35–4.94)
VLDLC SERPL CALC-MCNC: 13 MG/DL
WBC # SPEC AUTO: 6.2 X10(3)/MCL (ref 4.5–11.5)

## 2023-03-21 PROCEDURE — 80053 COMPREHEN METABOLIC PANEL: CPT

## 2023-03-21 PROCEDURE — 83036 HEMOGLOBIN GLYCOSYLATED A1C: CPT

## 2023-03-21 PROCEDURE — 80061 LIPID PANEL: CPT

## 2023-03-21 PROCEDURE — 85025 COMPLETE CBC W/AUTO DIFF WBC: CPT

## 2023-03-21 PROCEDURE — 36415 COLL VENOUS BLD VENIPUNCTURE: CPT

## 2023-03-21 PROCEDURE — 84443 ASSAY THYROID STIM HORMONE: CPT

## 2023-03-23 ENCOUNTER — OFFICE VISIT (OUTPATIENT)
Dept: INTERNAL MEDICINE | Facility: CLINIC | Age: 68
End: 2023-03-23
Payer: MEDICARE

## 2023-03-23 DIAGNOSIS — K21.9 GASTROESOPHAGEAL REFLUX DISEASE WITHOUT ESOPHAGITIS: ICD-10-CM

## 2023-03-23 DIAGNOSIS — I25.10 CAD S/P PERCUTANEOUS CORONARY ANGIOPLASTY: Primary | ICD-10-CM

## 2023-03-23 DIAGNOSIS — I10 PRIMARY HYPERTENSION: ICD-10-CM

## 2023-03-23 DIAGNOSIS — Z00.00 MEDICARE ANNUAL WELLNESS VISIT, SUBSEQUENT: ICD-10-CM

## 2023-03-23 DIAGNOSIS — F41.9 ANXIETY: ICD-10-CM

## 2023-03-23 DIAGNOSIS — Z98.61 CAD S/P PERCUTANEOUS CORONARY ANGIOPLASTY: Primary | ICD-10-CM

## 2023-03-23 DIAGNOSIS — E78.2 MIXED HYPERLIPIDEMIA: ICD-10-CM

## 2023-03-23 PROBLEM — E78.5 HYPERLIPIDEMIA: Chronic | Status: ACTIVE | Noted: 2018-04-30

## 2023-03-23 PROCEDURE — G0439 PPPS, SUBSEQ VISIT: HCPCS | Mod: ,,, | Performed by: INTERNAL MEDICINE

## 2023-03-23 PROCEDURE — G0439 PR MEDICARE ANNUAL WELLNESS SUBSEQUENT VISIT: ICD-10-PCS | Mod: ,,, | Performed by: INTERNAL MEDICINE

## 2023-03-23 RX ORDER — CLONAZEPAM 0.5 MG/1
0.5 TABLET ORAL NIGHTLY PRN
Qty: 30 TABLET | Refills: 5 | Status: SHIPPED | OUTPATIENT
Start: 2023-03-23 | End: 2023-03-23 | Stop reason: SDUPTHER

## 2023-03-23 RX ORDER — CLONAZEPAM 0.5 MG/1
0.5 TABLET ORAL NIGHTLY PRN
Qty: 30 TABLET | Refills: 5 | Status: SHIPPED | OUTPATIENT
Start: 2023-03-23

## 2023-03-23 RX ORDER — CLONAZEPAM 0.5 MG/1
0.5 TABLET ORAL NIGHTLY PRN
Qty: 30 TABLET | Refills: 5 | Status: CANCELLED | OUTPATIENT
Start: 2023-03-23

## 2023-03-23 NOTE — PROGRESS NOTES
Patient ID: [id number]     Chief Complaint: Medicare AWV (Wellness)      HPI:     Tres Durham is a 67 y.o. female here today for a Medicare Wellness. No other complaints today.     Ms. Mike is a 67-year-old female who is here today for her 6 month follow up and wellness visit. Her comorbidities include HLD, hypertriglyceridemia, metabolic syndrome, HTN and a positive ADRIANNA. She also has patellofemoral syndrome for which she had received a steroid injection in her left knee.  Patient has a hx of a rectocele for which patient is s/p correction.  Labs are reviewed and noted to be essentially normal except for elevated fasting glucose in the 140s however HbA1c staying around 6.3.  Advised on importance of taking the clonazepam only on a very p.r.n. basis.    MCW: 3/23/23  Cologuard: 2020, negative  MM2021  DEXA: 2021  Pap: Hysterectomy    Cardiology: Dr. Peralta  Orthopedics: Dr. Moon  GYN: Dr. Kumari  Neurology: Dr. Ruiz  ----------------------------  Anxiety and depression  CAD S/P percutaneous coronary angioplasty  Coronary artery disease  Herpes labialis  Hyperlipidemia  Hypertension  Hypertriglyceridemia  Metabolic syndrome  Pneumococcal vaccination declined by patient     Past Surgical History:   Procedure Laterality Date    endometriosis surgery      GI bleed      heart stent      HERNIA REPAIR  Dec 23 2021    Vaginal prolapse-rectocele    History of Left Heart Cath      LEFT HEART CATHETERIZATION Left 2022    Procedure: CATHETERIZATION, HEART, LEFT;  Surgeon: Jennifer Jensen MD;  Location: [facility] CATH LAB;  Service: Cardiology;  Laterality: Left;  LHC +/- PCI    prolapse surgery/rectocele      REPAIR OF RECTOCELE      ROTATOR CUFF REPAIR Right     SHOULDER ARTHROSCOPY      TONSILLECTOMY      TOTAL ABDOMINAL HYSTERECTOMY      vaginal suspension and fixation      WRIST SURGERY         Review of patient's allergies indicates:  No Known Allergies    Outpatient Medications Marked as Taking  for the 3/23/23 encounter (Office Visit) with Janeen Loredo MD   Medication Sig Dispense Refill    ezetimibe (ZETIA) 10 mg tablet Take 10 mg by mouth every evening.      LIVALO 2 mg Tab tablet Take 2 mg by mouth every evening. Every other day      losartan-hydrochlorothiazide 50-12.5 mg (HYZAAR) 50-12.5 mg per tablet Take 1 tablet by mouth once daily.      [DISCONTINUED] clonazePAM (KLONOPIN) 0.5 MG tablet TAKE 1 TABLET(0.5 MG) BY MOUTH EVERY NIGHT AS NEEDED FOR ANXIETY (Patient taking differently: Take 0.5 mg by mouth as needed. Patient states she took 1/2 tab) 30 tablet 5       Social History     Socioeconomic History    Marital status:    Tobacco Use    Smoking status: Never    Smokeless tobacco: Never   Substance and Sexual Activity    Alcohol use: No    Drug use: No    Sexual activity: Yes     Partners: Male        Family History   Problem Relation Age of Onset    Heart failure Mother         Patient Care Team:  Janeen Loredo MD as PCP - General (Internal Medicine)  Janeen Loredo MD     Opioid Screening: Patient medication list reviewed, patient is not taking prescription opioids. Patient is not using additional opioids than prescribed. Patient is at low risk of substance abuse based on this opioid use history.       Subjective:     ROS    A comprehensive review of systems is obtained and is essentially negative except for that stated in the HPI     Patient Reported Health Risk Assessment  What [age] 65-69  Are you male or female?: Female  During the past four weeks, how much have you been bothered by emotional problems such as feeling anxious, depressed, irritable, sad, or downhearted and blue?: Not at all  During the past five weeks, has your physical and/or emotional health limited your social activities with family, friends, neighbors, or groups?: Not at all  During the past four weeks, how much bodily pain have you generally had?: No pain  During the past four weeks, was  someone available to help if you needed and wanted help?: Yes, as much as I wanted  During the past four weeks, what was the hardest physical activity you could do for at least two minutes?: Heavy  Can you get to places out of walking distance without help?  (For example, can you travel alone on buses or taxis, or drive your own car?): Yes  Can you go shopping for groceries or clothes without someone's help?: Yes  Can you prepare your own meals?: Yes  Can you do your own housework without help?: Yes  Because of any health problems, do you need the help of another person with your personal care needs such as eating, bathing, dressing, or getting around the house?: No  Can you handle your own money without help?: Yes  During the past four weeks, how would you rate your health in general?: Excellent  How have things been going for you during the past four weeks?: Very well  Are you having difficulties driving your car?: No  Do you always fasten your seat belt when you are in a car?: Yes, usually  How often in the past four weeks have you been bothered by falling or dizzy when standing up?: Never  How often in the past four weeks have you been bothered by sexual problems?: Never  How often in the past four weeks have you been bothered by trouble eating well?: Never  How often in the past four weeks have you been bothered by teeth or denture problems?: Never  How often in the past four weeks have you been bothered with problems using the telephone?: Never  How often in the past four weeks have you been bothered by tiredness or fatigue?: Seldom  Have you fallen two or more times in the past year?: No  Are you afraid of falling?: No  Are you a smoker?: No  During the past four weeks, how many drinks of wine, beer, or other alcoholic beverages did you have?: No alcohol at all  Do you exercise for about 20 minutes three or more days a week?: Yes, most of the time  Have you been given any information to help you with hazards  "in your house that might hurt you?: No  Have you been given any information to help you with keeping track of your medications?: Yes  How often do you have trouble taking medicines the way you've been told to take them?: I always take them as prescribed  How confident are you that you can control and manage most of your health problems?: Very confident  What is your race? (Check all that apply.):     Objective:     BP (P) 124/68 (BP Location: Left arm, Patient Position: Sitting, BP Method: Small (Manual))   Pulse (P) 71   Temp (P) 98.2 °F (36.8 °C) (Temporal)   Ht (P) 5' 7" (1.702 m)   Wt (P) 76.7 kg (169 lb)   SpO2 (P) 97%   BMI (P) 26.47 kg/m²     Physical Exam  Constitutional:       Appearance: Normal appearance.   HENT:      Head: Normocephalic and atraumatic.      Nose: Nose normal.      Mouth/Throat:      Mouth: Mucous membranes are moist.      Pharynx: Oropharynx is clear.   Eyes:      Extraocular Movements: Extraocular movements intact.      Pupils: Pupils are equal, round, and reactive to light.   Cardiovascular:      Rate and Rhythm: Normal rate and regular rhythm.      Pulses: Normal pulses.   Pulmonary:      Effort: Pulmonary effort is normal.      Breath sounds: Normal breath sounds.   Abdominal:      General: Bowel sounds are normal.      Palpations: Abdomen is soft.   Musculoskeletal:         General: Normal range of motion.      Cervical back: Normal range of motion and neck supple.   Skin:     General: Skin is warm.   Neurological:      General: No focal deficit present.      Mental Status: She is alert and oriented to person, place, and time. Mental status is at baseline.   Psychiatric:         Mood and Affect: Mood normal.         No flowsheet data found.  Fall Risk Assessment - Outpatient 3/23/2023 8/22/2022   Mobility Status Ambulatory Ambulatory   Number of falls 0 0   Identified as fall risk 0 0           Depression Screening  Over the past two weeks, has the patient felt down, " depressed, or hopeless?: No  Over the past two weeks, has the patient felt little interest or pleasure in doing things?: No  Functional Ability/Safety Screening  Was the patient's timed Up & Go test unsteady or longer than 30 seconds?: No  Does the patient need help with phone, transportation, shopping, preparing meals, housework, laundry, meds, or managing money?: No  Does the patient's home have rugs in the hallway, lack grab bars in the bathroom, lack handrails on the stairs or have poor lighting?: No  Have you noticed any hearing difficulties?: No  Cognitive Function (Assessed through direct observation with due consideration of information obtained by way of patient reports and/or concerns raised by family, friends, caretakers, or others)    Does the patient repeat questions/statements in the same day?: No  Does the patient have trouble remembering the date, year, and time?: No  Does the patient have difficulty managing finances?: No  Does the patient have a decreased sense of direction?: No      Assessment:     Problem List Items Addressed This Visit          Cardiac/Vascular    Hypertension (Chronic)     -well controlled on current regimen with losartan hydrochlorothiazide 50-12.5 mg p.o. daily   -low-sodium diet   -importance of some form of routine aerobic exercise and weight loss is emphasized         Hyperlipidemia (Chronic)     -on Livalo and Zetia, continue         CAD S/P percutaneous coronary angioplasty - Primary     -no complaints of chest pain at this time   -on aspirin, ARB, Livalo and Zetia, continue            GI    GERD (gastroesophageal reflux disease)       Other    Medicare annual wellness visit, subsequent     -labs are reviewed all essentially normal  -patient is advised on importance of watching her carbohydrate intake and saturated fat intake, making the right nutritional choices and exercising on a regular basis  -up-to-date with the screening          Other Visit Diagnoses        Anxiety        Relevant Medications    clonazePAM (KLONOPIN) 0.5 MG tablet            Plan:     Medicare Annual Wellness and Personalized Prevention Plan:   Fall Risk + Home Safety + Hearing Impairment + Depression Screen + Cognitive Impairment Screen + Health Risk Assessment all reviewed.       Health Maintenance Topics with due status: Not Due       Topic Last Completion Date    Colorectal Cancer Screening 12/16/2020    DEXA Scan 03/05/2021    Hemoglobin A1c (Prediabetes) 03/21/2023    Lipid Panel 03/21/2023    High Dose Statin 03/23/2023      The patient's Health Maintenance was reviewed and the following appears to be due at this time:   There are no preventive care reminders to display for this patient.        Advance Care Planning   Deffered       Medication List with Changes/Refills   Current Medications    ASPIRIN 81 MG CHEW    CHEW AND SWALLOW ONE TABLET BY MOUTH DAILY       Start Date: 5/12/2022 End Date: --    EZETIMIBE (ZETIA) 10 MG TABLET    Take 10 mg by mouth every evening.       Start Date: 5/5/2022  End Date: --    LIVALO 2 MG TAB TABLET    Take 2 mg by mouth every evening. Every other day       Start Date: 5/31/2022 End Date: --    LOSARTAN-HYDROCHLOROTHIAZIDE 50-12.5 MG (HYZAAR) 50-12.5 MG PER TABLET    Take 1 tablet by mouth once daily.       Start Date: 4/29/2022 End Date: --   Changed and/or Refilled Medications    Modified Medication Previous Medication    CLONAZEPAM (KLONOPIN) 0.5 MG TABLET clonazePAM (KLONOPIN) 0.5 MG tablet       Take 1 tablet (0.5 mg total) by mouth nightly as needed for Anxiety.    TAKE 1 TABLET(0.5 MG) BY MOUTH EVERY NIGHT AS NEEDED FOR ANXIETY       Start Date: 3/23/2023 End Date: --    Start Date: 11/7/2022 End Date: 3/23/2023   Discontinued Medications    AMITRIPTYLINE (ELAVIL) 25 MG TABLET    TAKE 1 TABLET BY MOUTH EVERY DAY AT BEDTIME       Start Date: 12/28/2022End Date: 3/23/2023    BUSPIRONE (BUSPAR) 10 MG TABLET    Take 1 tablet (10 mg total) by mouth 3 (three)  times daily as needed.       Start Date: 8/22/2022 End Date: 3/23/2023    CIPROFLOXACIN-DEXAMETHASONE 0.3-0.1% (CIPRODEX) 0.3-0.1 % DRPS    Place 4 drops into both ears 2 (two) times daily.       Start Date: --        End Date: 3/23/2023    CONTOUR NEXT TEST STRIPS STRP    1 Stick by skin prick route 3 (three) times daily.       Start Date: 2/28/2022 End Date: 3/23/2023    LOSARTAN (COZAAR) 50 MG TABLET    Take 50 mg by mouth On call Procedure.       Start Date: --        End Date: 3/23/2023    VALACYCLOVIR (VALTREX) 1000 MG TABLET    Take 1,000 mg by mouth daily as needed.       Start Date: 2/26/2018 End Date: 3/23/2023        Follow up in about 6 months (around 9/23/2023) for BP Check. In addition to their scheduled follow up, the patient has also been instructed to follow up on as needed basis.

## 2023-03-23 NOTE — ASSESSMENT & PLAN NOTE
-well controlled on current regimen with losartan hydrochlorothiazide 50-12.5 mg p.o. daily   -low-sodium diet   -importance of some form of routine aerobic exercise and weight loss is emphasized

## 2023-04-03 ENCOUNTER — PATIENT MESSAGE (OUTPATIENT)
Dept: ADMINISTRATIVE | Facility: HOSPITAL | Age: 68
End: 2023-04-03
Payer: MEDICARE

## 2023-04-24 ENCOUNTER — PATIENT MESSAGE (OUTPATIENT)
Dept: ADMINISTRATIVE | Facility: HOSPITAL | Age: 68
End: 2023-04-24
Payer: MEDICARE

## 2023-05-22 ENCOUNTER — TELEPHONE (OUTPATIENT)
Dept: INTERNAL MEDICINE | Facility: CLINIC | Age: 68
End: 2023-05-22

## 2023-05-22 ENCOUNTER — OFFICE VISIT (OUTPATIENT)
Dept: INTERNAL MEDICINE | Facility: CLINIC | Age: 68
End: 2023-05-22
Payer: MEDICARE

## 2023-05-22 VITALS
HEART RATE: 64 BPM | DIASTOLIC BLOOD PRESSURE: 80 MMHG | BODY MASS INDEX: 27.78 KG/M2 | OXYGEN SATURATION: 98 % | WEIGHT: 177 LBS | HEIGHT: 67 IN | SYSTOLIC BLOOD PRESSURE: 136 MMHG | TEMPERATURE: 98 F

## 2023-05-22 DIAGNOSIS — I10 PRIMARY HYPERTENSION: Primary | Chronic | ICD-10-CM

## 2023-05-22 DIAGNOSIS — F32.A ANXIETY AND DEPRESSION: ICD-10-CM

## 2023-05-22 DIAGNOSIS — F41.9 ANXIETY AND DEPRESSION: ICD-10-CM

## 2023-05-22 DIAGNOSIS — M79.602 LEFT ARM PAIN: ICD-10-CM

## 2023-05-22 PROCEDURE — 99214 OFFICE O/P EST MOD 30 MIN: CPT | Mod: ,,, | Performed by: INTERNAL MEDICINE

## 2023-05-22 PROCEDURE — 99214 PR OFFICE/OUTPT VISIT, EST, LEVL IV, 30-39 MIN: ICD-10-PCS | Mod: ,,, | Performed by: INTERNAL MEDICINE

## 2023-05-22 RX ORDER — BUPROPION HYDROCHLORIDE 150 MG/1
150 TABLET ORAL DAILY
Qty: 30 TABLET | Refills: 11 | Status: SHIPPED | OUTPATIENT
Start: 2023-05-22 | End: 2023-06-13

## 2023-05-22 NOTE — TELEPHONE ENCOUNTER
Patient's cardiac troponin was noted to be negative at less than 0.01   -reassured patient, possibly anxiety-related symptoms, patient to start taking Wellbutrin.    -I have called and notified the patient

## 2023-05-22 NOTE — ASSESSMENT & PLAN NOTE
Start  Wellbutrin 150 mg p.o. daily   Okay to continue clonazepam on a p.r.n. basis  Practice deep breathing or abdominal breathing exercises when anxiety occurs.  Exercise daily. Get sunlight daily.  Avoid caffeine, alcohol and stimulants.  Practice positive phrases and repeat throughout the day, along with yoga and relaxation techniques.  Set healthy boundaries, avoid people and conversations that increase stress.  Educated patient on the risks of serotonin based medications such as serotonin modulators and SSRIs/SNRIs including common side effects of nausea, GI upset, headache dizziness as well as the rare risk for worsening symptoms of depression including development of suicidal thoughts or ideations, and serotonin syndrome.   Discussed benefits of medication not becoming noticeable until up to 6 weeks from start date.

## 2023-05-22 NOTE — ASSESSMENT & PLAN NOTE
-possibility of musculoskeletal/anxiety related pain   -patient will be initiated on Wellbutrin today, will see if it would help   -has clonazepam to use on a p.r.n. basis   -heat/ice application  -just for completion sake, checking a cardiac troponin to rule out ACS which seems clinically highly unlikely

## 2023-05-22 NOTE — PROGRESS NOTES
Subjective:      Patient ID: Tres Durham is a 67 y.o. female.    Chief Complaint: Pain in left arm    Ms. Mike is a 67-year-old female who is here today with concerns of left axillary and arm pain. Her comorbidities include HLD, hypertriglyceridemia, metabolic syndrome, HTN and a positive ADRIANNA.   Currently undergoing a lot of stress and anxiety.  The pain has been ongoing for about a week.  Unlikely heart attack however patient was worried and decided to come to the clinic.  We were able to work her into the schedule.  A point of care troponin will be drawn just to reassure patient however seems like her symptoms possibly stemming from uncontrolled anxiety.      MCW: 3/23/23  Cologuard: 2020, negative  MM2021  DEXA: 2021  Pap: Hysterectomy    Cardiology: Dr. Peralta  Orthopedics: Dr. Moon  GYN: Dr. Kumari  Neurology: Dr. Ruiz      The patient's Health Maintenance was reviewed and the following appears to be due at this time:   There are no preventive care reminders to display for this patient.     Past Medical History:  Past Medical History:   Diagnosis Date    Anxiety and depression     CAD S/P percutaneous coronary angioplasty     Coronary artery disease     Herpes labialis     Hyperlipidemia     Hypertension     Hypertriglyceridemia     Metabolic syndrome     Pneumococcal vaccination declined by patient      Past Surgical History:   Procedure Laterality Date    endometriosis surgery      GI bleed      heart stent      HERNIA REPAIR  Dec 23 2021    Vaginal prolapse-rectocele    History of Left Heart Cath      LEFT HEART CATHETERIZATION Left 2022    Procedure: CATHETERIZATION, HEART, LEFT;  Surgeon: Jennifer Jensen MD;  Location: [facility] CATH LAB;  Service: Cardiology;  Laterality: Left;  LHC +/- PCI    prolapse surgery/rectocele      REPAIR OF RECTOCELE      ROTATOR CUFF REPAIR Right     SHOULDER ARTHROSCOPY      TONSILLECTOMY      TOTAL ABDOMINAL HYSTERECTOMY      vaginal suspension and  "fixation      WRIST SURGERY       Review of patient's allergies indicates:  No Known Allergies  Social History     Socioeconomic History    Marital status:    Tobacco Use    Smoking status: Never    Smokeless tobacco: Never   Substance and Sexual Activity    Alcohol use: No    Drug use: No    Sexual activity: Yes     Partners: Male     Social Determinants of Health     Financial Resource Strain: Low Risk     Difficulty of Paying Living Expenses: Not hard at all   Food Insecurity: No Food Insecurity    Worried About Running Out of Food in the Last Year: Never true    Ran Out of Food in the Last Year: Never true   Transportation Needs: No Transportation Needs    Lack of Transportation (Medical): No    Lack of Transportation (Non-Medical): No   Physical Activity: Insufficiently Active    Days of Exercise per Week: 3 days    Minutes of Exercise per Session: 20 min   Stress: No Stress Concern Present    Feeling of Stress : Only a little   Social Connections: Socially Integrated    Frequency of Communication with Friends and Family: More than three times a week    Frequency of Social Gatherings with Friends and Family: More than three times a week    Attends Holiness Services: More than 4 times per year    Active Member of Clubs or Organizations: Yes    Attends Club or Organization Meetings: More than 4 times per year    Marital Status:    Housing Stability: Low Risk     Unable to Pay for Housing in the Last Year: No    Number of Places Lived in the Last Year: 1    Unstable Housing in the Last Year: No     Family History   Problem Relation Age of Onset    Heart failure Mother        Review of Systems    A comprehensive review of systems was performed and is negative except for that stated above  Objective:   /80 (BP Location: Left arm, Patient Position: Sitting, BP Method: Small (Manual))   Pulse 64   Temp 98.2 °F (36.8 °C) (Temporal)   Ht 5' 7" (1.702 m)   Wt 80.3 kg (177 lb)   SpO2 98%   BMI " 27.72 kg/m²     Physical Exam  Constitutional:       Appearance: Normal appearance.   HENT:      Head: Normocephalic and atraumatic.      Nose: Nose normal.      Mouth/Throat:      Mouth: Mucous membranes are moist.      Pharynx: Oropharynx is clear.   Eyes:      Extraocular Movements: Extraocular movements intact.      Pupils: Pupils are equal, round, and reactive to light.   Cardiovascular:      Rate and Rhythm: Normal rate and regular rhythm.      Pulses: Normal pulses.   Pulmonary:      Effort: Pulmonary effort is normal.      Breath sounds: Normal breath sounds.   Abdominal:      General: Bowel sounds are normal.      Palpations: Abdomen is soft.   Musculoskeletal:         General: Normal range of motion.      Cervical back: Normal range of motion and neck supple.   Skin:     General: Skin is warm.   Neurological:      General: No focal deficit present.      Mental Status: She is alert and oriented to person, place, and time. Mental status is at baseline.   Psychiatric:         Mood and Affect: Mood normal.     Assessment/ Plan:   1. Primary hypertension  Overview:  -on losartan hydrochlorothiazide 50-12.5 mg p.o. daily      2. Left arm pain  Assessment & Plan:  -possibility of musculoskeletal/anxiety related pain   -patient will be initiated on Wellbutrin today, will see if it would help   -has clonazepam to use on a p.r.n. basis   -heat/ice application  -just for completion sake, checking a cardiac troponin to rule out ACS which seems clinically highly unlikely    Orders:  -     Troponin I; Future; Expected date: 05/22/2023    3. Anxiety and depression  Assessment & Plan:  Start  Wellbutrin 150 mg p.o. daily   Okay to continue clonazepam on a p.r.n. basis  Practice deep breathing or abdominal breathing exercises when anxiety occurs.  Exercise daily. Get sunlight daily.  Avoid caffeine, alcohol and stimulants.  Practice positive phrases and repeat throughout the day, along with yoga and relaxation  techniques.  Set healthy boundaries, avoid people and conversations that increase stress.  Educated patient on the risks of serotonin based medications such as serotonin modulators and SSRIs/SNRIs including common side effects of nausea, GI upset, headache dizziness as well as the rare risk for worsening symptoms of depression including development of suicidal thoughts or ideations, and serotonin syndrome.   Discussed benefits of medication not becoming noticeable until up to 6 weeks from start date.         Other orders  -     buPROPion (WELLBUTRIN XL) 150 MG TB24 tablet; Take 1 tablet (150 mg total) by mouth once daily.  Dispense: 30 tablet; Refill: 11

## 2023-05-30 ENCOUNTER — PATIENT MESSAGE (OUTPATIENT)
Dept: ADMINISTRATIVE | Facility: HOSPITAL | Age: 68
End: 2023-05-30
Payer: MEDICARE

## 2023-06-13 ENCOUNTER — OFFICE VISIT (OUTPATIENT)
Dept: INTERNAL MEDICINE | Facility: CLINIC | Age: 68
End: 2023-06-13
Payer: MEDICARE

## 2023-06-13 VITALS
HEIGHT: 67 IN | OXYGEN SATURATION: 98 % | DIASTOLIC BLOOD PRESSURE: 74 MMHG | WEIGHT: 177 LBS | HEART RATE: 78 BPM | TEMPERATURE: 98 F | BODY MASS INDEX: 27.78 KG/M2 | SYSTOLIC BLOOD PRESSURE: 120 MMHG

## 2023-06-13 DIAGNOSIS — E78.2 MIXED HYPERLIPIDEMIA: Chronic | ICD-10-CM

## 2023-06-13 DIAGNOSIS — R10.84 GENERALIZED ABDOMINAL PAIN: ICD-10-CM

## 2023-06-13 DIAGNOSIS — K21.9 GASTROESOPHAGEAL REFLUX DISEASE WITHOUT ESOPHAGITIS: ICD-10-CM

## 2023-06-13 DIAGNOSIS — F51.01 PRIMARY INSOMNIA: ICD-10-CM

## 2023-06-13 DIAGNOSIS — Z12.31 BREAST CANCER SCREENING BY MAMMOGRAM: ICD-10-CM

## 2023-06-13 DIAGNOSIS — I10 PRIMARY HYPERTENSION: Primary | Chronic | ICD-10-CM

## 2023-06-13 PROCEDURE — 99214 OFFICE O/P EST MOD 30 MIN: CPT | Mod: ,,, | Performed by: INTERNAL MEDICINE

## 2023-06-13 PROCEDURE — 99214 PR OFFICE/OUTPT VISIT, EST, LEVL IV, 30-39 MIN: ICD-10-PCS | Mod: ,,, | Performed by: INTERNAL MEDICINE

## 2023-06-13 RX ORDER — TRAZODONE HYDROCHLORIDE 50 MG/1
50 TABLET ORAL NIGHTLY
Qty: 30 TABLET | Refills: 11 | Status: SHIPPED | OUTPATIENT
Start: 2023-06-13 | End: 2023-08-24

## 2023-06-13 RX ORDER — PANTOPRAZOLE SODIUM 40 MG/1
40 TABLET, DELAYED RELEASE ORAL DAILY
Qty: 30 TABLET | Refills: 5 | Status: SHIPPED | OUTPATIENT
Start: 2023-06-13 | End: 2024-06-12

## 2023-06-13 NOTE — ASSESSMENT & PLAN NOTE
-given a trial of trazodone 50 mg, patient addressed to start with half a tablet and then increase to a full tablet depending on how she does/tolerates   -emphasized on the importance of sleep hygiene

## 2023-06-13 NOTE — ASSESSMENT & PLAN NOTE
-patient advised to avoid foods that trigger acid reflux and he had at least 2 hours before bedtime.  -on Protonix 40 mg p.o. daily, continue  -can use Tums/Mylanta on a p.r.n. basis

## 2023-06-13 NOTE — ASSESSMENT & PLAN NOTE
-ultrasound abdomen for completion sake   -could all be possibly gastritis related, initiating on Protonix, should hopefully help   -follow-up on results of ultrasound and keep patient posted

## 2023-06-13 NOTE — PROGRESS NOTES
Subjective:      Patient ID: Tres Durham is a 67 y.o. female.    Chief Complaint: Anxiety, Abdominal Pain, Gastroesophageal Reflux, Fatigue, Insomnia, and red spots    Ms. Mike is a 67-year-old female who is here today with complaints of abdominal pain in a generalized fashion, no nausea or vomiting.  No complaints of fever or chills.    Patient does have some gastritis along with acid reflux like symptoms.  In the past was treated with Protonix for similar symptoms.    Does still have the gallbladder, will get a abdominal ultrasound for completion sake.    Also does not sleep very well at night for which trazodone was offered and patient is willing to try.    Anxiety seems to be better controlled, patient did not continue the Wellbutrin since it did give her nightmares       MCW: 3/23/23  Cologuard: 2020, negative  MM2021  DEXA: 2021  Pap: Hysterectomy    Cardiology: Dr. Peralta  Orthopedics: Dr. Moon  GYN: Dr. Kumari  Neurology: Dr. Ruiz    The patient's Health Maintenance was reviewed and the following appears to be due at this time:   Health Maintenance Due   Topic Date Due    Hepatitis C Screening  Never done        Past Medical History:  Past Medical History:   Diagnosis Date    Anxiety and depression     CAD S/P percutaneous coronary angioplasty     Coronary artery disease     Herpes labialis     Hyperlipidemia     Hypertension     Hypertriglyceridemia     Metabolic syndrome     Pneumococcal vaccination declined by patient      Past Surgical History:   Procedure Laterality Date    endometriosis surgery      GI bleed      heart stent      HERNIA REPAIR  Dec 23 2021    Vaginal prolapse-rectocele    History of Left Heart Cath      LEFT HEART CATHETERIZATION Left 2022    Procedure: CATHETERIZATION, HEART, LEFT;  Surgeon: Jennifer Jensen MD;  Location: [facility] CATH LAB;  Service: Cardiology;  Laterality: Left;  LHC +/- PCI    prolapse surgery/rectocele      REPAIR OF RECTOCELE       ROTATOR CUFF REPAIR Right     SHOULDER ARTHROSCOPY      TONSILLECTOMY      TOTAL ABDOMINAL HYSTERECTOMY      vaginal suspension and fixation      WRIST SURGERY       Review of patient's allergies indicates:  No Known Allergies  Social History     Socioeconomic History    Marital status:    Tobacco Use    Smoking status: Never    Smokeless tobacco: Never   Substance and Sexual Activity    Alcohol use: No    Drug use: No    Sexual activity: Yes     Partners: Male     Social Determinants of Health     Financial Resource Strain: Low Risk     Difficulty of Paying Living Expenses: Not hard at all   Food Insecurity: No Food Insecurity    Worried About Running Out of Food in the Last Year: Never true    Ran Out of Food in the Last Year: Never true   Transportation Needs: No Transportation Needs    Lack of Transportation (Medical): No    Lack of Transportation (Non-Medical): No   Physical Activity: Sufficiently Active    Days of Exercise per Week: 5 days    Minutes of Exercise per Session: 60 min   Stress: No Stress Concern Present    Feeling of Stress : Only a little   Social Connections: Moderately Integrated    Frequency of Communication with Friends and Family: More than three times a week    Frequency of Social Gatherings with Friends and Family: More than three times a week    Attends [employer] Services: More than 4 times per year    Active Member of Clubs or Organizations: No    Attends Club or Organization Meetings: Never    Marital Status:    Housing Stability: Low Risk     Unable to Pay for Housing in the Last Year: No    Number of Places Lived in the Last Year: 1    Unstable Housing in the Last Year: No     Family History   Problem Relation Age of Onset    Heart failure Mother        Review of Systems    A comprehensive review of systems was performed and is negative except for that stated above  Objective:   /74 (BP Location: Left arm, Patient Position: Sitting, BP Method: Small (Manual))    "Pulse 78   Temp 98.3 °F (36.8 °C) (Temporal)   Ht 5' 7" (1.702 m)   Wt 80.3 kg (177 lb)   SpO2 98%   BMI 27.72 kg/m²     Physical Exam  Constitutional:       Appearance: Normal appearance.   HENT:      Head: Normocephalic and atraumatic.      Nose: Nose normal.      Mouth/Throat:      Mouth: Mucous membranes are moist.      Pharynx: Oropharynx is clear.   Eyes:      Extraocular Movements: Extraocular movements intact.      Pupils: Pupils are equal, round, and reactive to light.   Cardiovascular:      Rate and Rhythm: Normal rate and regular rhythm.      Pulses: Normal pulses.   Pulmonary:      Effort: Pulmonary effort is normal.      Breath sounds: Normal breath sounds.   Abdominal:      General: Bowel sounds are normal.      Palpations: Abdomen is soft.   Musculoskeletal:         General: Normal range of motion.      Cervical back: Normal range of motion and neck supple.   Skin:     General: Skin is warm.   Neurological:      General: No focal deficit present.      Mental Status: She is alert and oriented to person, place, and time. Mental status is at baseline.   Psychiatric:         Mood and Affect: Mood normal.     Assessment/ Plan:   1. Primary hypertension  Overview:  -on losartan hydrochlorothiazide 50-12.5 mg p.o. daily      2. Generalized abdominal pain  Assessment & Plan:  -ultrasound abdomen for completion sake   -could all be possibly gastritis related, initiating on Protonix, should hopefully help   -follow-up on results of ultrasound and keep patient posted       Orders:  -     traZODone (DESYREL) 50 MG tablet; Take 1 tablet (50 mg total) by mouth every evening.  Dispense: 30 tablet; Refill: 11  -     pantoprazole (PROTONIX) 40 MG tablet; Take 1 tablet (40 mg total) by mouth once daily.  Dispense: 30 tablet; Refill: 5  -     US Abdomen Complete; Future; Expected date: 06/13/2023    3. Mixed hyperlipidemia  Overview:  - On Livalo and Zetia    Assessment & Plan:  -on Zetia and Livalo, " continue      4. Gastroesophageal reflux disease without esophagitis  Assessment & Plan:  -patient advised to avoid foods that trigger acid reflux and he had at least 2 hours before bedtime.  -on Protonix 40 mg p.o. daily, continue  -can use Tums/Mylanta on a p.r.n. basis         5. Primary insomnia  Assessment & Plan:  -given a trial of trazodone 50 mg, patient addressed to start with half a tablet and then increase to a full tablet depending on how she does/tolerates   -emphasized on the importance of sleep hygiene

## 2023-06-14 ENCOUNTER — TELEPHONE (OUTPATIENT)
Dept: INTERNAL MEDICINE | Facility: CLINIC | Age: 68
End: 2023-06-14
Payer: MEDICARE

## 2023-06-14 DIAGNOSIS — R10.84 GENERALIZED ABDOMINAL PAIN: Primary | ICD-10-CM

## 2023-06-14 NOTE — TELEPHONE ENCOUNTER
----- Message from Janeen Loredo MD sent at 6/14/2023  2:47 PM CDT -----  Please contact the patient and let them know that their  ultrasound results were fine  except for of left-sided renal cyst for which of contrast enhanced CT has been ordered for further delineation.  Once this is completed, I will keep her posted on the results.  Please reassure that this is just for completion sake. , thank you

## 2023-06-15 ENCOUNTER — TELEPHONE (OUTPATIENT)
Dept: INTERNAL MEDICINE | Facility: CLINIC | Age: 68
End: 2023-06-15
Payer: MEDICARE

## 2023-06-15 DIAGNOSIS — Q61.02 MULTIPLE RENAL CYSTS: Primary | ICD-10-CM

## 2023-06-15 NOTE — TELEPHONE ENCOUNTER
----- Message from Janeen Loredo MD sent at 6/15/2023  3:54 PM CDT -----  Please contact the patient and let them know that their CT results were consistent with bilateral renal cysts also, more on the left than on the right.  Because of her symptoms if patient would like, a referral can be sent to Urology however I do not think any acute intervention is deemed necessary.  If patient wants a urology referral, please go ahead and send it to [facility], thank you

## 2023-06-15 NOTE — TELEPHONE ENCOUNTER
Called patient and gave results and recommendations. Orders and information faxed to [facility]

## 2023-06-26 PROBLEM — Z00.00 MEDICARE ANNUAL WELLNESS VISIT, SUBSEQUENT: Status: RESOLVED | Noted: 2023-03-23 | Resolved: 2023-06-26

## 2023-07-21 ENCOUNTER — OFFICE VISIT (OUTPATIENT)
Dept: INTERNAL MEDICINE | Facility: CLINIC | Age: 68
End: 2023-07-21
Payer: MEDICARE

## 2023-07-21 VITALS
BODY MASS INDEX: 27.56 KG/M2 | WEIGHT: 175.63 LBS | DIASTOLIC BLOOD PRESSURE: 88 MMHG | OXYGEN SATURATION: 98 % | HEART RATE: 58 BPM | SYSTOLIC BLOOD PRESSURE: 138 MMHG | HEIGHT: 67 IN | TEMPERATURE: 98 F

## 2023-07-21 DIAGNOSIS — Z13.83 SCREENING FOR CARDIOVASCULAR, RESPIRATORY, AND GENITOURINARY DISEASES: ICD-10-CM

## 2023-07-21 DIAGNOSIS — Z13.21 SCREENING FOR ENDOCRINE, NUTRITIONAL, METABOLIC AND IMMUNITY DISORDER: ICD-10-CM

## 2023-07-21 DIAGNOSIS — Z78.0 POST-MENOPAUSAL: ICD-10-CM

## 2023-07-21 DIAGNOSIS — Z13.29 SCREENING FOR ENDOCRINE, NUTRITIONAL, METABOLIC AND IMMUNITY DISORDER: ICD-10-CM

## 2023-07-21 DIAGNOSIS — Z13.0 SCREENING FOR ENDOCRINE, NUTRITIONAL, METABOLIC AND IMMUNITY DISORDER: ICD-10-CM

## 2023-07-21 DIAGNOSIS — Z13.228 SCREENING FOR ENDOCRINE, NUTRITIONAL, METABOLIC AND IMMUNITY DISORDER: ICD-10-CM

## 2023-07-21 DIAGNOSIS — Z13.6 SCREENING FOR CARDIOVASCULAR, RESPIRATORY, AND GENITOURINARY DISEASES: ICD-10-CM

## 2023-07-21 DIAGNOSIS — R53.83 FATIGUE, UNSPECIFIED TYPE: Primary | ICD-10-CM

## 2023-07-21 DIAGNOSIS — Z13.89 SCREENING FOR CARDIOVASCULAR, RESPIRATORY, AND GENITOURINARY DISEASES: ICD-10-CM

## 2023-07-21 PROBLEM — F51.01 PRIMARY INSOMNIA: Chronic | Status: ACTIVE | Noted: 2023-06-13

## 2023-07-21 PROBLEM — E78.2 MIXED HYPERLIPIDEMIA: Status: ACTIVE | Noted: 2018-04-30

## 2023-07-21 PROBLEM — I25.119 CORONARY ARTERY DISEASE INVOLVING NATIVE CORONARY ARTERY OF NATIVE HEART WITH ANGINA PECTORIS: Chronic | Status: ACTIVE | Noted: 2018-04-30

## 2023-07-21 PROBLEM — F41.9 ANXIETY AND DEPRESSION: Chronic | Status: ACTIVE | Noted: 2022-08-22

## 2023-07-21 PROBLEM — K21.9 GERD (GASTROESOPHAGEAL REFLUX DISEASE): Chronic | Status: ACTIVE | Noted: 2018-04-30

## 2023-07-21 PROBLEM — F32.A ANXIETY AND DEPRESSION: Chronic | Status: ACTIVE | Noted: 2022-08-22

## 2023-07-21 PROCEDURE — 99213 OFFICE O/P EST LOW 20 MIN: CPT | Mod: ,,,

## 2023-07-21 PROCEDURE — 99213 PR OFFICE/OUTPT VISIT, EST, LEVL III, 20-29 MIN: ICD-10-PCS | Mod: ,,,

## 2023-07-21 NOTE — PROGRESS NOTES
Patient ID: Tres Durham is a 67 y.o. female.    Chief Complaint: Abdominal Pain    67-year-old female here today for requested visit.  Medical comorbidities include CAD s/p PTCA, HTN, hypertriglyceridemia, and anxiety.  Today presents for requested visit regarding ongoing struggles with fatigue-see below.  Onset of symptoms greater than 1 month.  Per patient recently has been under somewhat more stress at home.  Not taking care of her teenage grandson and attempting to cope with family stressors.  Reports 100% compliance currently on clonazepam p.r.n. and trazodone nightly.  Does have some concerns of current symptoms being due to possible underlying medical condition.  Denies worsening depression, fevers, chills, body aches.  Does voice interest in insurance coverage of blood work.  All questions answered to best of ability.     MCW: 3/23/23  Pap: Hysterectomy    Cardiology: Dr. Peralta  Orthopedics: Dr. Moon  GYN: Dr. Kumari  Neurology: Dr. Ruiz      Fatigue  This is a new problem. The current episode started more than 1 month ago. The problem occurs constantly. The problem has been waxing and waning. Associated symptoms include fatigue. Pertinent negatives include no abdominal pain, arthralgias, change in bowel habit, chest pain, chills, congestion, coughing, diaphoresis, fever, headaches, joint swelling, myalgias, nausea, rash, sore throat, vomiting or weakness. The symptoms are aggravated by stress. She has tried rest for the symptoms. The treatment provided no relief.     MEDICAL HISTORY:    Past Medical History:   Diagnosis Date    Anxiety and depression     CAD S/P percutaneous coronary angioplasty     Herpes labialis     Hypertension     Hypertriglyceridemia     Metabolic syndrome     Pneumococcal vaccination declined by patient       Past Surgical History:   Procedure Laterality Date    endometriosis surgery      GI bleed      heart stent      HERNIA REPAIR  Dec 23 2021    Vaginal  "prolapse-rectocele    History of Left Heart Cath      LEFT HEART CATHETERIZATION Left 12/05/2022    Procedure: CATHETERIZATION, HEART, LEFT;  Surgeon: Jennifer Jensen MD;  Location: [facility] CATH LAB;  Service: Cardiology;  Laterality: Left;  C +/- PCI    prolapse surgery/rectocele      REPAIR OF RECTOCELE      ROTATOR CUFF REPAIR Right     SHOULDER ARTHROSCOPY      TONSILLECTOMY      TOTAL ABDOMINAL HYSTERECTOMY      vaginal suspension and fixation      WRIST SURGERY        Social History     Tobacco Use    Smoking status: Never    Smokeless tobacco: Never   Substance Use Topics    Alcohol use: No    Drug use: No          Health Maintenance Due   Topic Date Due    Hepatitis C Screening  Never done          Patient Care Team:  Janeen Loredo MD as PCP - General (Internal Medicine)  Janeen Loredo MD      Review of Systems   Constitutional:  Positive for fatigue. Negative for chills, diaphoresis, fever and unexpected weight change.   HENT:  Negative for congestion, rhinorrhea, sore throat and trouble swallowing.    Eyes:  Negative for redness and visual disturbance.   Respiratory:  Negative for cough, chest tightness and shortness of breath.    Cardiovascular:  Negative for chest pain and palpitations.   Gastrointestinal:  Negative for abdominal pain, change in bowel habit, constipation, diarrhea, nausea and vomiting.   Genitourinary:  Negative for dysuria, flank pain, frequency and urgency.   Musculoskeletal:  Negative for arthralgias, gait problem, joint swelling and myalgias.   Skin:  Negative for rash and wound.   Neurological:  Negative for facial asymmetry, speech difficulty, weakness and headaches.   Psychiatric/Behavioral:  The patient is nervous/anxious (Increased stress).    All other systems reviewed and are negative.    Objective:   /88   Pulse (!) 58   Temp 98 °F (36.7 °C) (Temporal)   Ht 5' 7" (1.702 m)   Wt 79.7 kg (175 lb 9.6 oz)   SpO2 98%   BMI 27.50 kg/m²      Physical " Exam  Constitutional:       General: She is not in acute distress.     Appearance: Normal appearance.   HENT:      Right Ear: Tympanic membrane, ear canal and external ear normal.      Left Ear: Tympanic membrane, ear canal and external ear normal.      Nose: Nose normal.      Mouth/Throat:      Mouth: Mucous membranes are moist.      Pharynx: Oropharynx is clear.   Eyes:      Extraocular Movements: Extraocular movements intact.      Conjunctiva/sclera: Conjunctivae normal.      Pupils: Pupils are equal, round, and reactive to light.   Cardiovascular:      Rate and Rhythm: Normal rate and regular rhythm.      Pulses: Normal pulses.      Heart sounds: Normal heart sounds. No murmur heard.    No gallop.   Pulmonary:      Effort: Pulmonary effort is normal.      Breath sounds: Normal breath sounds. No wheezing.   Abdominal:      General: Bowel sounds are normal. There is no distension.      Palpations: Abdomen is soft. There is no mass.      Tenderness: There is no abdominal tenderness. There is no guarding.   Musculoskeletal:         General: Normal range of motion.   Skin:     General: Skin is warm and dry.   Neurological:      Mental Status: She is alert. Mental status is at baseline.      Sensory: No sensory deficit.      Motor: No weakness.         Assessment:       ICD-10-CM ICD-9-CM   1. Fatigue, unspecified type  R53.83 780.79   2. Post-menopausal  Z78.0 V49.81   3. Screening for cardiovascular, respiratory, and genitourinary diseases  Z13.6 V81.2    Z13.89 V81.6    Z13.83 V81.4   4. Screening for endocrine, nutritional, metabolic and immunity disorder  Z13.29 V77.99    Z13.21     Z13.228     Z13.0         Plan:     Problem List Items Addressed This Visit          Cardiac/Vascular    Screening for cardiovascular, respiratory, and genitourinary diseases     -order baseline labs         Relevant Orders    TSH    Comprehensive Metabolic Panel    CBC Auto Differential       Renal/    Post-menopausal     -order  baseline labs         Relevant Orders    TSH    Comprehensive Metabolic Panel    CBC Auto Differential       Endocrine    Screening for endocrine, nutritional, metabolic and immunity disorder     -order baseline labs         Relevant Orders    TSH    Comprehensive Metabolic Panel    CBC Auto Differential       Other    Fatigue - Primary     -acute  -possibly related to increase physical and mental stressors  -order baseline labs to rule out other etiologies         Relevant Orders    TSH    Comprehensive Metabolic Panel    CBC Auto Differential          Follow up for Previously scheduled and PRN if need.   -plan specifics discussed above    Orders Placed This Encounter    TSH    Comprehensive Metabolic Panel    CBC Auto Differential        Medication List with Changes/Refills   Current Medications    ASPIRIN 81 MG CHEW    CHEW AND SWALLOW ONE TABLET BY MOUTH DAILY    CLONAZEPAM (KLONOPIN) 0.5 MG TABLET    Take 1 tablet (0.5 mg total) by mouth nightly as needed for Anxiety.    EZETIMIBE (ZETIA) 10 MG TABLET    Take 10 mg by mouth every evening.    LIVALO 2 MG TAB TABLET    Take 2 mg by mouth every evening. Every other day    LOSARTAN-HYDROCHLOROTHIAZIDE 50-12.5 MG (HYZAAR) 50-12.5 MG PER TABLET    Take 1 tablet by mouth once daily.    PANTOPRAZOLE (PROTONIX) 40 MG TABLET    Take 1 tablet (40 mg total) by mouth once daily.    TRAZODONE (DESYREL) 50 MG TABLET    Take 1 tablet (50 mg total) by mouth every evening.

## 2023-07-21 NOTE — ASSESSMENT & PLAN NOTE
-acute  -possibly related to increase physical and mental stressors  -order baseline labs to rule out other etiologies

## 2023-08-24 ENCOUNTER — OFFICE VISIT (OUTPATIENT)
Dept: INTERNAL MEDICINE | Facility: CLINIC | Age: 68
End: 2023-08-24
Payer: MEDICARE

## 2023-08-24 VITALS
RESPIRATION RATE: 18 BRPM | BODY MASS INDEX: 28.94 KG/M2 | TEMPERATURE: 97 F | HEART RATE: 63 BPM | WEIGHT: 184.81 LBS | OXYGEN SATURATION: 97 % | SYSTOLIC BLOOD PRESSURE: 126 MMHG | DIASTOLIC BLOOD PRESSURE: 74 MMHG

## 2023-08-24 DIAGNOSIS — F32.A ANXIETY AND DEPRESSION: Primary | Chronic | ICD-10-CM

## 2023-08-24 DIAGNOSIS — E34.9 HORMONE IMBALANCE: ICD-10-CM

## 2023-08-24 DIAGNOSIS — E55.9 VITAMIN D DEFICIENCY: ICD-10-CM

## 2023-08-24 DIAGNOSIS — Z78.0 POST-MENOPAUSAL: ICD-10-CM

## 2023-08-24 DIAGNOSIS — F41.9 ANXIETY AND DEPRESSION: Primary | Chronic | ICD-10-CM

## 2023-08-24 DIAGNOSIS — I10 PRIMARY HYPERTENSION: Chronic | ICD-10-CM

## 2023-08-24 PROCEDURE — 99214 OFFICE O/P EST MOD 30 MIN: CPT | Mod: ,,, | Performed by: INTERNAL MEDICINE

## 2023-08-24 PROCEDURE — 99214 PR OFFICE/OUTPT VISIT, EST, LEVL IV, 30-39 MIN: ICD-10-PCS | Mod: ,,, | Performed by: INTERNAL MEDICINE

## 2023-08-24 RX ORDER — LOSARTAN POTASSIUM AND HYDROCHLOROTHIAZIDE 12.5; 5 MG/1; MG/1
1 TABLET ORAL DAILY
Qty: 90 TABLET | Refills: 1 | Status: SHIPPED | OUTPATIENT
Start: 2023-08-24 | End: 2024-02-29

## 2023-08-24 RX ORDER — ESTRADIOL 0.1 MG/G
1 CREAM VAGINAL
COMMUNITY
Start: 2023-08-01

## 2023-08-24 NOTE — ASSESSMENT & PLAN NOTE
-on losartan hydrochlorothiazide 50-12.5 mg p.o. daily which works better than just plain losartan, continue   -low-sodium diet   -hydration is emphasized

## 2023-08-24 NOTE — ASSESSMENT & PLAN NOTE
-at last visit patient was initiated on Wellbutrin 150 mg p.o. daily   -okay to use clonazepam on a p.r.n. basis   -patient was started to take the Wellbutrin, her gynecologist gave her prescription for Prozac.    -advised to start the Prozac at 10 mg p.o. daily and titrate as appropriate   -also educated that the effects of the medicine will not be realized until at least 2-3 weeks of taking it on a consistent basis

## 2023-08-24 NOTE — PROGRESS NOTES
Subjective:      Patient ID: Tres Durham is a 67 y.o. female.    Chief Complaint: Nausea (Nausea, fatigue, knee and hip pain, also joint pain, concerns of hormone levels, )    67-year-old female here today for requested visit.  Medical comorbidities include CAD s/p PTCA, HTN, hypertriglyceridemia, and anxiety.  At our last visit patient was given a prescription for Wellbutrin however never started taking it.    She was since seen by her gynecologist and was given a prescription for Prozac however has not started taking it either.  Uses clonazepam only on a p.r.n. basis.    Today patient has multiple complaints with overall arthralgia, shoulder pain again for which she is being followed by [facility].    She is also not able to lose weight however also endorses to the fact that she has been eating unhealthy and not watching her carbohydrates or her sugary food intake.      She would also like to get hormones levels checked.  Advised that we will not be doing hormone replacement however asking for progesterone cream.  We will add progesterone and estrogen level at this time and will defer to Gynecology depending on the results.    No other acute needs or complaints     MCW: 3/23/23  Pap: Hysterectomy     Cardiology: Dr. Peralta  Orthopedics: Dr. Moon  GYN: Dr. Kumari  Neurology: Dr. Ruiz             The patient's Health Maintenance was reviewed and the following appears to be due at this time:   Health Maintenance Due   Topic Date Due    Hepatitis C Screening  Never done    COVID-19 Vaccine (1) Never done    TETANUS VACCINE  Never done    Shingles Vaccine (1 of 2) Never done    Pneumococcal Vaccines (Age 65+) (1 - PCV) Never done    Mammogram  03/05/2022        Past Medical History:  Past Medical History:   Diagnosis Date    Anxiety and depression     CAD S/P percutaneous coronary angioplasty     Herpes labialis     Hypertension     Hypertriglyceridemia     Metabolic syndrome     Pneumococcal vaccination declined by  patient      Past Surgical History:   Procedure Laterality Date    endometriosis surgery      GI bleed      heart stent      HERNIA REPAIR  Dec 23 2021    Vaginal prolapse-rectocele    History of Left Heart Cath      LEFT HEART CATHETERIZATION Left 12/05/2022    Procedure: CATHETERIZATION, HEART, LEFT;  Surgeon: Jennifer Jensen MD;  Location: [facility] CATH LAB;  Service: Cardiology;  Laterality: Left;  LHC +/- PCI    prolapse surgery/rectocele      REPAIR OF RECTOCELE      ROTATOR CUFF REPAIR Right     SHOULDER ARTHROSCOPY      TONSILLECTOMY      TOTAL ABDOMINAL HYSTERECTOMY      vaginal suspension and fixation      WRIST SURGERY       Review of patient's allergies indicates:  No Known Allergies  Social History     Socioeconomic History    Marital status:    Tobacco Use    Smoking status: Never    Smokeless tobacco: Never   Substance and Sexual Activity    Alcohol use: No    Drug use: No    Sexual activity: Yes     Partners: Male     Social Determinants of Health     Financial Resource Strain: Low Risk  (6/13/2023)    Overall Financial Resource Strain (CARDIA)     Difficulty of Paying Living Expenses: Not hard at all   Food Insecurity: No Food Insecurity (6/13/2023)    Hunger Vital Sign     Worried About Running Out of Food in the Last Year: Never true     Ran Out of Food in the Last Year: Never true   Transportation Needs: No Transportation Needs (6/13/2023)    PRAPARE - Transportation     Lack of Transportation (Medical): No     Lack of Transportation (Non-Medical): No   Physical Activity: Sufficiently Active (6/13/2023)    Exercise Vital Sign     Days of Exercise per Week: 5 days     Minutes of Exercise per Session: 60 min   Recent Concern: Physical Activity - Insufficiently Active (5/22/2023)    Exercise Vital Sign     Days of Exercise per Week: 3 days     Minutes of Exercise per Session: 20 min   Stress: No Stress Concern Present (6/13/2023)    [employer] - Occupational Stress  Questionnaire     Feeling of Stress : Only a little   Social Connections: Moderately Integrated (6/13/2023)    Social Connection and Isolation Panel [NHANES]     Frequency of Communication with Friends and Family: More than three times a week     Frequency of Social Gatherings with Friends and Family: More than three times a week     Attends [employer] Services: More than 4 times per year     Active Member of Clubs or Organizations: No     Attends Club or Organization Meetings: Never     Marital Status:    Housing Stability: Low Risk  (6/13/2023)    Housing Stability Vital Sign     Unable to Pay for Housing in the Last Year: No     Number of Places Lived in the Last Year: 1     Unstable Housing in the Last Year: No     Family History   Problem Relation Age of Onset    Heart failure Mother        Review of Systems    A comprehensive review of systems was performed and is negative except for that stated above  Objective:   /74 (BP Location: Left arm, Patient Position: Sitting, BP Method: Small (Automatic))   Pulse 63   Temp 96.8 °F (36 °C) (Temporal)   Resp 18   Wt 83.8 kg (184 lb 12.8 oz)   SpO2 97%   BMI 28.94 kg/m²     Physical Exam  Constitutional:       Appearance: Normal appearance.   HENT:      Head: Normocephalic and atraumatic.      Nose: Nose normal.      Mouth/Throat:      Mouth: Mucous membranes are moist.      Pharynx: Oropharynx is clear.   Eyes:      Extraocular Movements: Extraocular movements intact.      Pupils: Pupils are equal, round, and reactive to light.   Cardiovascular:      Rate and Rhythm: Normal rate and regular rhythm.      Pulses: Normal pulses.   Pulmonary:      Effort: Pulmonary effort is normal.      Breath sounds: Normal breath sounds.   Abdominal:      General: Bowel sounds are normal.      Palpations: Abdomen is soft.   Musculoskeletal:         General: Normal range of motion.      Cervical back: Normal range of motion and neck supple.   Skin:     General: Skin is  warm.   Neurological:      General: No focal deficit present.      Mental Status: She is alert and oriented to person, place, and time. Mental status is at baseline.   Psychiatric:         Mood and Affect: Mood normal.       Assessment/ Plan:   1. Anxiety and depression  Assessment & Plan:  -at last visit patient was initiated on Wellbutrin 150 mg p.o. daily   -okay to use clonazepam on a p.r.n. basis   -patient was started to take the Wellbutrin, her gynecologist gave her prescription for Prozac.    -advised to start the Prozac at 10 mg p.o. daily and titrate as appropriate   -also educated that the effects of the medicine will not be realized until at least 2-3 weeks of taking it on a consistent basis      2. Post-menopausal  -     Estrogens, fractionated; Future; Expected date: 08/24/2023  -     Progesterone; Future; Expected date: 08/24/2023    3. Vitamin D deficiency  -     Vitamin D; Future; Expected date: 08/24/2023    4. Hormone imbalance  -     Estrogens, fractionated; Future; Expected date: 08/24/2023  -     Progesterone; Future; Expected date: 08/24/2023    5. Primary hypertension  Overview:  -on losartan hydrochlorothiazide 50-12.5 mg p.o. daily    Assessment & Plan:  -on losartan hydrochlorothiazide 50-12.5 mg p.o. daily which works better than just plain losartan, continue   -low-sodium diet   -hydration is emphasized      Other orders  -     losartan-hydrochlorothiazide 50-12.5 mg (HYZAAR) 50-12.5 mg per tablet; Take 1 tablet by mouth once daily.  Dispense: 90 tablet; Refill: 1

## 2023-08-25 ENCOUNTER — LAB VISIT (OUTPATIENT)
Dept: LAB | Facility: HOSPITAL | Age: 68
End: 2023-08-25
Attending: INTERNAL MEDICINE
Payer: MEDICARE

## 2023-08-25 DIAGNOSIS — I10 PRIMARY HYPERTENSION: Primary | Chronic | ICD-10-CM

## 2023-08-25 DIAGNOSIS — I10 PRIMARY HYPERTENSION: ICD-10-CM

## 2023-08-25 DIAGNOSIS — E78.2 MIXED HYPERLIPIDEMIA: Primary | ICD-10-CM

## 2023-08-25 DIAGNOSIS — Z78.0 POST-MENOPAUSAL: ICD-10-CM

## 2023-08-25 DIAGNOSIS — E55.9 VITAMIN D DEFICIENCY: ICD-10-CM

## 2023-08-25 DIAGNOSIS — E78.2 MIXED HYPERLIPIDEMIA: ICD-10-CM

## 2023-08-25 DIAGNOSIS — E34.9 HORMONE IMBALANCE: ICD-10-CM

## 2023-08-25 LAB
ALBUMIN SERPL-MCNC: 4.1 G/DL (ref 3.4–4.8)
ALBUMIN/GLOB SERPL: 1.6 RATIO (ref 1.1–2)
ALP SERPL-CCNC: 78 UNIT/L (ref 40–150)
ALT SERPL-CCNC: 23 UNIT/L (ref 0–55)
AST SERPL-CCNC: 25 UNIT/L (ref 5–34)
BASOPHILS # BLD AUTO: 0.02 X10(3)/MCL
BASOPHILS NFR BLD AUTO: 0.2 %
BILIRUB SERPL-MCNC: 0.6 MG/DL
BUN SERPL-MCNC: 19.1 MG/DL (ref 9.8–20.1)
CALCIUM SERPL-MCNC: 9.8 MG/DL (ref 8.4–10.2)
CHLORIDE SERPL-SCNC: 103 MMOL/L (ref 98–107)
CO2 SERPL-SCNC: 27 MMOL/L (ref 23–31)
CREAT SERPL-MCNC: 0.8 MG/DL (ref 0.55–1.02)
DEPRECATED CALCIDIOL+CALCIFEROL SERPL-MC: 105.8 NG/ML (ref 30–80)
EOSINOPHIL # BLD AUTO: 0.36 X10(3)/MCL (ref 0–0.9)
EOSINOPHIL NFR BLD AUTO: 4.1 %
ERYTHROCYTE [DISTWIDTH] IN BLOOD BY AUTOMATED COUNT: 13 % (ref 11.5–17)
GFR SERPLBLD CREATININE-BSD FMLA CKD-EPI: >60 MLS/MIN/1.73/M2
GLOBULIN SER-MCNC: 2.5 GM/DL (ref 2.4–3.5)
GLUCOSE SERPL-MCNC: 147 MG/DL (ref 82–115)
HCT VFR BLD AUTO: 47.6 % (ref 37–47)
HGB BLD-MCNC: 15.6 G/DL (ref 12–16)
IMM GRANULOCYTES # BLD AUTO: 0.03 X10(3)/MCL (ref 0–0.04)
IMM GRANULOCYTES NFR BLD AUTO: 0.3 %
LYMPHOCYTES # BLD AUTO: 1.85 X10(3)/MCL (ref 0.6–4.6)
LYMPHOCYTES NFR BLD AUTO: 21.1 %
MCH RBC QN AUTO: 30.2 PG (ref 27–31)
MCHC RBC AUTO-ENTMCNC: 32.8 G/DL (ref 33–36)
MCV RBC AUTO: 92.1 FL (ref 80–94)
MONOCYTES # BLD AUTO: 0.55 X10(3)/MCL (ref 0.1–1.3)
MONOCYTES NFR BLD AUTO: 6.3 %
NEUTROPHILS # BLD AUTO: 5.94 X10(3)/MCL (ref 2.1–9.2)
NEUTROPHILS NFR BLD AUTO: 68 %
NRBC BLD AUTO-RTO: 0 %
PLATELET # BLD AUTO: 278 X10(3)/MCL (ref 130–400)
PMV BLD AUTO: 9.7 FL (ref 7.4–10.4)
POTASSIUM SERPL-SCNC: 5 MMOL/L (ref 3.5–5.1)
PROGEST SERPL-MCNC: <0.5 NG/ML
PROT SERPL-MCNC: 6.6 GM/DL (ref 5.8–7.6)
RBC # BLD AUTO: 5.17 X10(6)/MCL (ref 4.2–5.4)
SODIUM SERPL-SCNC: 141 MMOL/L (ref 136–145)
TSH SERPL-ACNC: 2.07 UIU/ML (ref 0.35–4.94)
WBC # SPEC AUTO: 8.75 X10(3)/MCL (ref 4.5–11.5)

## 2023-08-25 PROCEDURE — 82306 VITAMIN D 25 HYDROXY: CPT

## 2023-08-25 PROCEDURE — 84443 ASSAY THYROID STIM HORMONE: CPT

## 2023-08-25 PROCEDURE — 80053 COMPREHEN METABOLIC PANEL: CPT

## 2023-08-25 PROCEDURE — 84144 ASSAY OF PROGESTERONE: CPT

## 2023-08-25 PROCEDURE — 36415 COLL VENOUS BLD VENIPUNCTURE: CPT

## 2023-08-25 PROCEDURE — 82671 ASSAY OF ESTROGENS: CPT

## 2023-08-25 PROCEDURE — 85025 COMPLETE CBC W/AUTO DIFF WBC: CPT

## 2023-08-28 LAB
ESTRADIOL SERPL-MCNC: <10 PG/ML
ESTRONE SERPL-MCNC: 23 PG/ML

## 2023-09-07 ENCOUNTER — PATIENT MESSAGE (OUTPATIENT)
Dept: RESEARCH | Facility: HOSPITAL | Age: 68
End: 2023-09-07
Payer: MEDICARE

## 2023-09-19 ENCOUNTER — PATIENT MESSAGE (OUTPATIENT)
Dept: ADMINISTRATIVE | Facility: HOSPITAL | Age: 68
End: 2023-09-19
Payer: MEDICARE

## 2023-09-21 ENCOUNTER — TELEPHONE (OUTPATIENT)
Dept: INTERNAL MEDICINE | Facility: CLINIC | Age: 68
End: 2023-09-21
Payer: MEDICARE

## 2023-10-05 ENCOUNTER — TELEPHONE (OUTPATIENT)
Dept: INTERNAL MEDICINE | Facility: CLINIC | Age: 68
End: 2023-10-05
Payer: MEDICARE

## 2023-10-05 NOTE — TELEPHONE ENCOUNTER
Pt called stating she thinks she has a Pilonidal Cyst on her sacrum. Pt wanted to know what kind of doctor took those out and names of some. Pt thinks it may be infected. Pt has never been seen in the office for this, she stated her insurance doesn't need a referral. I gave her some names of General Surgeons. Pt was happy with that.

## 2023-10-23 PROBLEM — Z13.29 SCREENING FOR ENDOCRINE, NUTRITIONAL, METABOLIC AND IMMUNITY DISORDER: Status: RESOLVED | Noted: 2023-07-21 | Resolved: 2023-10-23

## 2023-10-23 PROBLEM — Z13.228 SCREENING FOR ENDOCRINE, NUTRITIONAL, METABOLIC AND IMMUNITY DISORDER: Status: RESOLVED | Noted: 2023-07-21 | Resolved: 2023-10-23

## 2023-10-23 PROBLEM — Z13.89 SCREENING FOR CARDIOVASCULAR, RESPIRATORY, AND GENITOURINARY DISEASES: Status: RESOLVED | Noted: 2023-07-21 | Resolved: 2023-10-23

## 2023-10-23 PROBLEM — Z13.21 SCREENING FOR ENDOCRINE, NUTRITIONAL, METABOLIC AND IMMUNITY DISORDER: Status: RESOLVED | Noted: 2023-07-21 | Resolved: 2023-10-23

## 2023-10-23 PROBLEM — Z13.83 SCREENING FOR CARDIOVASCULAR, RESPIRATORY, AND GENITOURINARY DISEASES: Status: RESOLVED | Noted: 2023-07-21 | Resolved: 2023-10-23

## 2023-10-23 PROBLEM — Z13.6 SCREENING FOR CARDIOVASCULAR, RESPIRATORY, AND GENITOURINARY DISEASES: Status: RESOLVED | Noted: 2023-07-21 | Resolved: 2023-10-23

## 2023-10-23 PROBLEM — Z13.0 SCREENING FOR ENDOCRINE, NUTRITIONAL, METABOLIC AND IMMUNITY DISORDER: Status: RESOLVED | Noted: 2023-07-21 | Resolved: 2023-10-23

## 2024-01-10 ENCOUNTER — TELEPHONE (OUTPATIENT)
Dept: INTERNAL MEDICINE | Facility: CLINIC | Age: 69
End: 2024-01-10
Payer: MEDICARE

## 2024-01-17 ENCOUNTER — PATIENT MESSAGE (OUTPATIENT)
Dept: ADMINISTRATIVE | Facility: HOSPITAL | Age: 69
End: 2024-01-17
Payer: MEDICARE

## 2024-01-17 ENCOUNTER — OFFICE VISIT (OUTPATIENT)
Dept: INTERNAL MEDICINE | Facility: CLINIC | Age: 69
End: 2024-01-17
Payer: MEDICARE

## 2024-01-17 VITALS
OXYGEN SATURATION: 96 % | DIASTOLIC BLOOD PRESSURE: 66 MMHG | BODY MASS INDEX: 27.62 KG/M2 | HEIGHT: 67 IN | WEIGHT: 176 LBS | HEART RATE: 71 BPM | SYSTOLIC BLOOD PRESSURE: 112 MMHG

## 2024-01-17 DIAGNOSIS — Z96.649 PAIN IN HIP REGION AFTER HIP REPLACEMENT: ICD-10-CM

## 2024-01-17 DIAGNOSIS — I25.119 CORONARY ARTERY DISEASE INVOLVING NATIVE CORONARY ARTERY OF NATIVE HEART WITH ANGINA PECTORIS: Chronic | ICD-10-CM

## 2024-01-17 DIAGNOSIS — R73.03 PREDIABETES: ICD-10-CM

## 2024-01-17 DIAGNOSIS — M25.551 PAIN OF RIGHT HIP: ICD-10-CM

## 2024-01-17 DIAGNOSIS — Z12.11 SCREEN FOR COLON CANCER: ICD-10-CM

## 2024-01-17 DIAGNOSIS — F41.9 ANXIETY AND DEPRESSION: Chronic | ICD-10-CM

## 2024-01-17 DIAGNOSIS — Z12.31 BREAST CANCER SCREENING BY MAMMOGRAM: ICD-10-CM

## 2024-01-17 DIAGNOSIS — M25.559 PAIN IN HIP REGION AFTER HIP REPLACEMENT: ICD-10-CM

## 2024-01-17 DIAGNOSIS — K21.9 GASTROESOPHAGEAL REFLUX DISEASE WITHOUT ESOPHAGITIS: Chronic | ICD-10-CM

## 2024-01-17 DIAGNOSIS — I10 PRIMARY HYPERTENSION: Chronic | ICD-10-CM

## 2024-01-17 DIAGNOSIS — F32.A ANXIETY AND DEPRESSION: Chronic | ICD-10-CM

## 2024-01-17 DIAGNOSIS — R26.89 UNSTABLE BALANCE: ICD-10-CM

## 2024-01-17 DIAGNOSIS — E78.2 MIXED HYPERLIPIDEMIA: Primary | ICD-10-CM

## 2024-01-17 PROCEDURE — 99214 OFFICE O/P EST MOD 30 MIN: CPT | Mod: ,,, | Performed by: INTERNAL MEDICINE

## 2024-01-17 RX ORDER — FLUOXETINE 10 MG/1
10 CAPSULE ORAL EVERY MORNING
COMMUNITY
End: 2024-04-30 | Stop reason: SDUPTHER

## 2024-01-17 RX ORDER — HYDROCODONE BITARTRATE AND ACETAMINOPHEN 10; 325 MG/1; MG/1
1 TABLET ORAL EVERY 4 HOURS PRN
COMMUNITY
Start: 2024-01-12 | End: 2024-04-30

## 2024-01-17 RX ORDER — EMPAGLIFLOZIN 25 MG/1
25 TABLET, FILM COATED ORAL EVERY MORNING
COMMUNITY

## 2024-01-17 RX ORDER — CELECOXIB 200 MG/1
200 CAPSULE ORAL DAILY PRN
COMMUNITY
End: 2024-01-17

## 2024-01-17 NOTE — PROGRESS NOTES
Subjective:      Patient ID: Tres Durham is a 68 y.o. female.    Chief Complaint: Follow-up (6 month HTN)    Ms. Mike is a 68-year-old female who is here today for her six-month follow-up.  Her medical comorbidities include CAD status post PTCA, hypertension, hypertriglyceridemia and anxiety.    Between our last visit and now patient is status post right hip replacement.  Requesting physical therapy, this was never ordered per Orthopedics.  She feels that she is losing her strength and would like some therapy to regain it back.    No other acute needs or complaints today.  Was started on Jardiance 25 mg p.o. daily per her family practitioner.  Last HGB A1c was noted to be at 6.3.      MCW: 3/23/23  Pap: Hysterectomy     Cardiology: Dr. Peralta  Orthopedics: Dr. Moon  GYN: Dr. Kumari  Neurology: Dr. Ruiz       The patient's Health Maintenance was reviewed and the following appears to be due at this time:   Health Maintenance Due   Topic Date Due    Hepatitis C Screening  Never done    COVID-19 Vaccine (1) Never done    TETANUS VACCINE  Never done    Shingles Vaccine (1 of 2) Never done    RSV Vaccine (Age 60+ and Pregnant patients) (1 - 1-dose 60+ series) Never done    Pneumococcal Vaccines (Age 65+) (1 - PCV) Never done    Mammogram  03/05/2022    Influenza Vaccine (1) Never done    Colorectal Cancer Screening  12/16/2023    DEXA Scan  03/05/2024        Past Medical History:  Past Medical History:   Diagnosis Date    Anxiety and depression     CAD S/P percutaneous coronary angioplasty     Herpes labialis     Hypertension     Hypertriglyceridemia     Metabolic syndrome     Pneumococcal vaccination declined by patient      Past Surgical History:   Procedure Laterality Date    endometriosis surgery      GI bleed      heart stent      HERNIA REPAIR  Dec 23 2021    Vaginal prolapse-rectocele    HIP REPLACEMENT ARTHROPLASTY Right 12/22/2023    History of Left Heart Cath      LEFT HEART CATHETERIZATION Left  12/05/2022    Procedure: CATHETERIZATION, HEART, LEFT;  Surgeon: Jennifer Jensen MD;  Location: [facility] CATH LAB;  Service: Cardiology;  Laterality: Left;  LHC +/- PCI    prolapse surgery/rectocele      REPAIR OF RECTOCELE      ROTATOR CUFF REPAIR Right     SHOULDER ARTHROSCOPY      TONSILLECTOMY      TOTAL ABDOMINAL HYSTERECTOMY      vaginal suspension and fixation      WRIST SURGERY       Review of patient's allergies indicates:  No Known Allergies  Social History     Socioeconomic History    Marital status:    Tobacco Use    Smoking status: Never    Smokeless tobacco: Never   Substance and Sexual Activity    Alcohol use: No    Drug use: No    Sexual activity: Yes     Partners: Male     Social Determinants of Health     Financial Resource Strain: Low Risk  (6/13/2023)    Overall Financial Resource Strain (CARDIA)     Difficulty of Paying Living Expenses: Not hard at all   Food Insecurity: No Food Insecurity (6/13/2023)    Hunger Vital Sign     Worried About Running Out of Food in the Last Year: Never true     Ran Out of Food in the Last Year: Never true   Transportation Needs: No Transportation Needs (6/13/2023)    PRAPARE - Transportation     Lack of Transportation (Medical): No     Lack of Transportation (Non-Medical): No   Physical Activity: Sufficiently Active (6/13/2023)    Exercise Vital Sign     Days of Exercise per Week: 5 days     Minutes of Exercise per Session: 60 min   Recent Concern: Physical Activity - Insufficiently Active (5/22/2023)    Exercise Vital Sign     Days of Exercise per Week: 3 days     Minutes of Exercise per Session: 20 min   Stress: No Stress Concern Present (6/13/2023)    [employer] [employer] - Occupational Stress Questionnaire     Feeling of Stress : Only a little   Social Connections: Moderately Integrated (6/13/2023)    Social Connection and Isolation Panel [NHANES]     Frequency of Communication with Friends and Family: More than three times a week      "Frequency of Social Gatherings with Friends and Family: More than three times a week     Attends [employer] Services: More than 4 times per year     Active Member of Clubs or Organizations: No     Attends Club or Organization Meetings: Never     Marital Status:    Housing Stability: Low Risk  (6/13/2023)    Housing Stability Vital Sign     Unable to Pay for Housing in the Last Year: No     Number of Places Lived in the Last Year: 1     Unstable Housing in the Last Year: No     Family History   Problem Relation Age of Onset    Heart failure Mother        Review of Systems    A comprehensive review of systems was performed and is negative except for that stated above  Objective:   /66 (BP Location: Left arm, Patient Position: Sitting, BP Method: Small (Manual))   Pulse 71   Ht 5' 7" (1.702 m)   Wt 79.8 kg (176 lb)   SpO2 96%   BMI 27.57 kg/m²     Physical Exam  Constitutional:       Appearance: Normal appearance.   HENT:      Head: Normocephalic and atraumatic.      Nose: Nose normal.      Mouth/Throat:      Mouth: Mucous membranes are moist.      Pharynx: Oropharynx is clear.   Eyes:      Extraocular Movements: Extraocular movements intact.      Pupils: Pupils are equal, round, and reactive to light.   Cardiovascular:      Rate and Rhythm: Normal rate and regular rhythm.      Pulses: Normal pulses.           Dorsalis pedis pulses are 2+ on the right side and 2+ on the left side.   Pulmonary:      Effort: Pulmonary effort is normal.      Breath sounds: Normal breath sounds.   Abdominal:      General: Bowel sounds are normal.      Palpations: Abdomen is soft.   Musculoskeletal:         General: Normal range of motion.      Cervical back: Normal range of motion and neck supple.   Feet:      Right foot:      Protective Sensation: 3 sites tested.  3 sites sensed.      Skin integrity: No ulcer, skin breakdown or erythema.      Toenail Condition: Right toenails are normal.      Left foot:      Protective " Sensation: 3 sites tested.  3 sites sensed.      Skin integrity: No ulcer, skin breakdown or erythema.      Toenail Condition: Left toenails are normal.   Skin:     General: Skin is warm.   Neurological:      General: No focal deficit present.      Mental Status: She is alert and oriented to person, place, and time. Mental status is at baseline.   Psychiatric:         Mood and Affect: Mood normal.       Assessment/ Plan:   1. Mixed hyperlipidemia  Overview:  - On Livalo and Zetia    Assessment & Plan:  -patient is on Livalo and Zetia, continue      2. Anxiety and depression  Assessment & Plan:  -on 10 mg of fluoxetine, doing well, discussed no indication at this time to go up on the dosing      3. Primary hypertension  Overview:  -on losartan hydrochlorothiazide 50-12.5 mg p.o. daily    Assessment & Plan:  Well controlled.   Continue Losartan hydrochlorothiazide 50-12.5 mg p.o. daily  Low Sodium Diet (DASH Diet - Less than 2 grams of sodium per day).  Monitor blood pressure daily and log. Report consistent numbers greater than 140/90.  Maintain healthy weight with goal BMI <30. Exercise 30 minutes per day, 5 days per week.           4. Coronary artery disease involving native coronary artery of native heart with angina pectoris  Assessment & Plan:  -stable, medically optimized      5. Pain in hip region after hip replacement  Assessment & Plan:  -sending a referral for [facility]    Orders:  -     Ambulatory referral/consult to Physical/Occupational Therapy; Future; Expected date: 01/17/2024    6. Pain of right hip  -     Ambulatory referral/consult to Physical/Occupational Therapy; Future; Expected date: 01/17/2024    7. Unstable balance  -     Ambulatory referral/consult to Physical/Occupational Therapy; Future; Expected date: 01/17/2024    8. Gastroesophageal reflux disease without esophagitis  Assessment & Plan:  -patient advised to avoid foods that trigger acid reflux and he had at least  2 hours before bedtime.       9. Prediabetes  Assessment & Plan:  -patient advised on the importance of avoiding excessive carbohydrates and sugary food intake and having some form of routine aerobic exercise on a regular basis.   -on Jardiance 25 mg p.o. daily, continue   -HGB A1c for next visit

## 2024-01-17 NOTE — ASSESSMENT & PLAN NOTE
-patient advised on the importance of avoiding excessive carbohydrates and sugary food intake and having some form of routine aerobic exercise on a regular basis.   -on Jardiance 25 mg p.o. daily, continue   -HGB A1c for next visit

## 2024-01-17 NOTE — ASSESSMENT & PLAN NOTE
Well controlled.   Continue Losartan hydrochlorothiazide 50-12.5 mg p.o. daily  Low Sodium Diet (DASH Diet - Less than 2 grams of sodium per day).  Monitor blood pressure daily and log. Report consistent numbers greater than 140/90.  Maintain healthy weight with goal BMI <30. Exercise 30 minutes per day, 5 days per week.

## 2024-01-17 NOTE — ASSESSMENT & PLAN NOTE
-patient advised to avoid foods that trigger acid reflux and he had at least 2 hours before bedtime.

## 2024-01-29 LAB — NONINV COLON CA DNA+OCC BLD SCRN STL QL: NEGATIVE

## 2024-02-29 DIAGNOSIS — I10 PRIMARY HYPERTENSION: Primary | Chronic | ICD-10-CM

## 2024-02-29 RX ORDER — LOSARTAN POTASSIUM AND HYDROCHLOROTHIAZIDE 12.5; 5 MG/1; MG/1
1 TABLET ORAL
Qty: 90 TABLET | Refills: 1 | Status: SHIPPED | OUTPATIENT
Start: 2024-02-29

## 2024-03-11 ENCOUNTER — TELEPHONE (OUTPATIENT)
Dept: INTERNAL MEDICINE | Facility: CLINIC | Age: 69
End: 2024-03-11
Payer: MEDICARE

## 2024-03-11 DIAGNOSIS — Z96.649 PAIN IN HIP REGION AFTER HIP REPLACEMENT: Primary | ICD-10-CM

## 2024-03-11 DIAGNOSIS — R26.89 UNSTABLE BALANCE: ICD-10-CM

## 2024-03-11 DIAGNOSIS — M25.551 PAIN OF RIGHT HIP: ICD-10-CM

## 2024-03-11 DIAGNOSIS — M25.559 PAIN IN HIP REGION AFTER HIP REPLACEMENT: Primary | ICD-10-CM

## 2024-03-11 NOTE — TELEPHONE ENCOUNTER
----- Message from Leslee Ellison sent at 3/11/2024 11:27 AM CDT -----  Regarding: Physical Therapy Referral  Pt called and stated she would like Physical Therapy again states at visit she canceled it but would like it back everything should be in her chart, she also stated she talk to Sonny at [facility] and they told her she can come back she just need another referral order, states she has been stumbling and really tired, needing help to get up/ asked if another Referral can be sent to [facility] Therapist on [address].. please advise     [phone] Rashid

## 2024-04-30 ENCOUNTER — OFFICE VISIT (OUTPATIENT)
Dept: INTERNAL MEDICINE | Facility: CLINIC | Age: 69
End: 2024-04-30
Payer: COMMERCIAL

## 2024-04-30 VITALS
HEIGHT: 67 IN | BODY MASS INDEX: 27.15 KG/M2 | SYSTOLIC BLOOD PRESSURE: 138 MMHG | OXYGEN SATURATION: 99 % | DIASTOLIC BLOOD PRESSURE: 84 MMHG | HEART RATE: 67 BPM | WEIGHT: 173 LBS
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
## 2024-04-30 DIAGNOSIS — I10 PRIMARY HYPERTENSION: Chronic | ICD-10-CM

## 2024-04-30 DIAGNOSIS — E88.810 METABOLIC SYNDROME: ICD-10-CM

## 2024-04-30 DIAGNOSIS — R42 DIZZINESS AND GIDDINESS: Primary | ICD-10-CM

## 2024-04-30 DIAGNOSIS — F41.9 ANXIETY AND DEPRESSION: Chronic | ICD-10-CM

## 2024-04-30 DIAGNOSIS — F32.A ANXIETY AND DEPRESSION: Chronic | ICD-10-CM

## 2024-04-30 PROCEDURE — 99214 OFFICE O/P EST MOD 30 MIN: CPT | Mod: ,,, | Performed by: INTERNAL MEDICINE

## 2024-04-30 RX ORDER — TRAZODONE HYDROCHLORIDE 50 MG/1
50 TABLET ORAL NIGHTLY
Qty: 30 TABLET | Refills: 11 | Status: SHIPPED | OUTPATIENT
Start: 2024-04-30 | End: 2025-04-30

## 2024-04-30 RX ORDER — PANTOPRAZOLE SODIUM 20 MG/1
20 TABLET, DELAYED RELEASE ORAL DAILY
COMMUNITY

## 2024-04-30 RX ORDER — FLUOXETINE HYDROCHLORIDE 20 MG/1
20 CAPSULE ORAL DAILY
Qty: 90 CAPSULE | Refills: 1 | Status: SHIPPED | OUTPATIENT
Start: 2024-04-30

## 2024-04-30 RX ORDER — FLUOXETINE 10 MG/1
20 CAPSULE ORAL DAILY
Qty: 30 CAPSULE | Refills: 3 | Status: SHIPPED | OUTPATIENT
Start: 2024-04-30 | End: 2024-04-30 | Stop reason: SDUPTHER
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
## 2024-05-01 ENCOUNTER — TELEPHONE (OUTPATIENT)
Dept: INTERNAL MEDICINE | Facility: CLINIC | Age: 69
End: 2024-05-01
Payer: MEDICARE

## 2024-05-01 DIAGNOSIS — M54.9 BACK PAIN, UNSPECIFIED BACK LOCATION, UNSPECIFIED BACK PAIN LATERALITY, UNSPECIFIED CHRONICITY: Primary | ICD-10-CM

## 2024-05-01 NOTE — TELEPHONE ENCOUNTER
----- Message from Janeen Loredo MD sent at 4/30/2024  3:21 PM CDT -----  Please contact the patient and let them know that their imaging results were fine and completely normal and do not require any change in treatment.

## 2024-05-01 NOTE — TELEPHONE ENCOUNTER
Spoke with patient and she was given results and recommendations. Would like to see about referral to PT for pain.

## 2024-05-08 ENCOUNTER — TELEPHONE (OUTPATIENT)
Dept: INTERNAL MEDICINE | Facility: CLINIC | Age: 69
End: 2024-05-08
Payer: MEDICARE

## 2024-06-17 ENCOUNTER — PATIENT OUTREACH (OUTPATIENT)
Facility: CLINIC | Age: 69
End: 2024-06-17
Payer: MEDICARE

## 2024-06-17 NOTE — PROGRESS NOTES
Health Maintenance Topic(s) Outreach Outcomes & Actions Taken:    Breast Cancer Screening - Outreach Outcomes & Actions Taken  : Mammogram Order Placed and portal message sent to remind patient to schedule her mmg.        Additional Notes:

## 2024-07-08 DIAGNOSIS — R73.03 PREDIABETES: ICD-10-CM

## 2024-07-08 DIAGNOSIS — E78.5 HYPERLIPIDEMIA, UNSPECIFIED HYPERLIPIDEMIA TYPE: ICD-10-CM

## 2024-07-08 DIAGNOSIS — Z13.21 SCREENING FOR ENDOCRINE, NUTRITIONAL, METABOLIC AND IMMUNITY DISORDER: ICD-10-CM

## 2024-07-08 DIAGNOSIS — E55.9 VITAMIN D DEFICIENCY: ICD-10-CM

## 2024-07-08 DIAGNOSIS — Z13.89 SCREENING FOR CARDIOVASCULAR, RESPIRATORY, AND GENITOURINARY DISEASES: ICD-10-CM

## 2024-07-08 DIAGNOSIS — Z13.0 SCREENING FOR ENDOCRINE, NUTRITIONAL, METABOLIC AND IMMUNITY DISORDER: ICD-10-CM

## 2024-07-08 DIAGNOSIS — Z13.6 SCREENING FOR CARDIOVASCULAR, RESPIRATORY, AND GENITOURINARY DISEASES: ICD-10-CM

## 2024-07-08 DIAGNOSIS — Z13.83 SCREENING FOR CARDIOVASCULAR, RESPIRATORY, AND GENITOURINARY DISEASES: ICD-10-CM

## 2024-07-08 DIAGNOSIS — Z13.29 SCREENING FOR ENDOCRINE, NUTRITIONAL, METABOLIC AND IMMUNITY DISORDER: ICD-10-CM

## 2024-07-08 DIAGNOSIS — E88.810 METABOLIC SYNDROME: ICD-10-CM

## 2024-07-08 DIAGNOSIS — Z13.228 SCREENING FOR ENDOCRINE, NUTRITIONAL, METABOLIC AND IMMUNITY DISORDER: ICD-10-CM

## 2024-07-08 DIAGNOSIS — I10 PRIMARY HYPERTENSION: Primary | ICD-10-CM

## 2024-07-11 ENCOUNTER — TELEPHONE (OUTPATIENT)
Dept: INTERNAL MEDICINE | Facility: CLINIC | Age: 69
End: 2024-07-11
Payer: MEDICARE

## 2024-09-06 ENCOUNTER — PATIENT OUTREACH (OUTPATIENT)
Facility: CLINIC | Age: 69
End: 2024-09-06
Payer: MEDICARE

## 2024-09-06 NOTE — PROGRESS NOTES
Population Health Outreach.    Dr. Loredo no longer PCP    New PCP:  Dr. Chuck Sorensen -Care Team updated

## 2024-10-01 DIAGNOSIS — I10 PRIMARY HYPERTENSION: Chronic | ICD-10-CM

## 2024-10-01 RX ORDER — LOSARTAN POTASSIUM AND HYDROCHLOROTHIAZIDE 12.5; 5 MG/1; MG/1
1 TABLET ORAL
Qty: 90 TABLET | Refills: 3 | Status: SHIPPED | OUTPATIENT
Start: 2024-10-01

## 2024-10-31 DIAGNOSIS — F41.9 ANXIETY AND DEPRESSION: Primary | ICD-10-CM

## 2024-10-31 DIAGNOSIS — F32.A ANXIETY AND DEPRESSION: Primary | ICD-10-CM

## 2024-10-31 RX ORDER — FLUOXETINE HYDROCHLORIDE 20 MG/1
CAPSULE ORAL
Qty: 90 CAPSULE | Refills: 1 | Status: SHIPPED | OUTPATIENT
Start: 2024-10-31

## 2025-08-01 NOTE — ASSESSMENT & PLAN NOTE
-continue with losartan hydrochlorothiazide 50-12.5 mg p.o. daily   -low-sodium diet  
-currently uncontrolled   -on fluoxetine 10 mg p.o. daily, increasing dose to 20 mg p.o. daily   -also on Klonopin nightly p.r.n.  -adding trazodone to help with insomnia  
-patient advised on the importance of avoiding excessive carbohydrates and sugary food intake and having some form of routine aerobic exercise on a regular basis.   -continue Jardiance 25 mg every morning, seems to be doing well  
-patient was already evaluated by Orthopedic urgent Care, is scheduled to see  regarding the back pain   -getting a CT head to rule out concussion since patient does have some episodes of confusion and ongoing headaches     
show

## (undated) DEVICE — GUIDEWIRE EMERALD .035IN 260CM

## (undated) DEVICE — TUBING INJ M/F 1000 PSI 20IN

## (undated) DEVICE — COVER PROBE US 5.5X58L NON LTX

## (undated) DEVICE — INTRODUCER TRNSRADIAL 6F 10CM

## (undated) DEVICE — PAD DEFIB RADIOLUCENT ADULT

## (undated) DEVICE — DRAPE ANGIO BRACH 38X44IN

## (undated) DEVICE — CATH OPTITORQUE RADIAL 5FR

## (undated) DEVICE — Device

## (undated) DEVICE — BAND TR COMP DEVICE REG 24CM

## (undated) DEVICE — CANNULA NASAL ADULT

## (undated) DEVICE — SET ANGIO ACIST CVI ANGIOTOUCH